# Patient Record
Sex: FEMALE | Race: OTHER | NOT HISPANIC OR LATINO | URBAN - METROPOLITAN AREA
[De-identification: names, ages, dates, MRNs, and addresses within clinical notes are randomized per-mention and may not be internally consistent; named-entity substitution may affect disease eponyms.]

---

## 2017-10-21 ENCOUNTER — EMERGENCY (EMERGENCY)
Facility: HOSPITAL | Age: 62
LOS: 1 days | Discharge: HOME | End: 2017-10-21
Attending: EMERGENCY MEDICINE | Admitting: EMERGENCY MEDICINE
Payer: COMMERCIAL

## 2017-10-21 VITALS
TEMPERATURE: 98 F | DIASTOLIC BLOOD PRESSURE: 68 MMHG | WEIGHT: 164.91 LBS | RESPIRATION RATE: 16 BRPM | SYSTOLIC BLOOD PRESSURE: 129 MMHG | HEART RATE: 84 BPM | HEIGHT: 61 IN

## 2017-10-21 DIAGNOSIS — Z98.89 OTHER SPECIFIED POSTPROCEDURAL STATES: Chronic | ICD-10-CM

## 2017-10-21 PROCEDURE — 99282 EMERGENCY DEPT VISIT SF MDM: CPT | Mod: 25

## 2017-10-21 PROCEDURE — 69210 REMOVE IMPACTED EAR WAX UNI: CPT

## 2017-10-21 PROCEDURE — 69210 REMOVE IMPACTED EAR WAX UNI: CPT | Mod: RT

## 2017-10-21 PROCEDURE — 99283 EMERGENCY DEPT VISIT LOW MDM: CPT | Mod: 25

## 2017-10-21 NOTE — ED PROVIDER NOTE - MEDICAL DECISION MAKING DETAILS
Patient is a 62 year old with acute hearing difficulty from one ear starting this morning. There is obvious wax impaction on the right year. Patient is a 62 year old with acute hearing difficulty from one ear starting this morning. There is obvious wax impaction on the right year. DDX: Ear wax impaction, sensorineural hearing loss, foreign body. Attempted to place debrox ear drops and gently manipulate with a curet. Patient had some improvement afterwards but still complained of dull hearing. Debrox drops were given to patient along with instructions to follow up with ENT if the problem persists. Patient is a 62 year old with acute hearing difficulty from one ear starting this morning. There is obvious wax impaction on the right year. DDX: Ear wax impaction, sensorineural hearing loss, foreign body. Attempted to place debrox ear drops and gently manipulate with a curet. Patient had some improvement afterwards but still complained of dull hearing. Ear lavage was done with resolution of symptoms. Debrox drops were given to patient along with instructions to follow up with ENT if the problem persists.

## 2017-10-21 NOTE — ED PROVIDER NOTE - CARE PLAN
Principal Discharge DX:	Hearing difficulty  Instructions for follow-up, activity and diet:	There is still some pressure in your ear Principal Discharge DX:	Hearing difficulty

## 2017-10-21 NOTE — ED PROVIDER NOTE - ATTENDING CONTRIBUTION TO CARE
Patient with occluded right ear. Sent in by urgent care for irrigation and suction. Moderate to severe ear fullness and irritation. Persistent.   right and left cerumen impaction right much worse than left, no pain to pinna/mastoid/antipinna to palpation, NAD, NCAT, MMM, Trachea midline, Normal conjunctiva, CTAB, Non-tachycardic, Normal perfusion, n No edema, No deformity of extremities, Appropriate, Cooperative, With capacity and insight, No rashes, CN grossly intact  cerumen impaction  will irrigate ear, will try debrox, bulb suction of ear, curette and discharge to ENT  No immediate life threatening issues present on history or clinical exam. Patient is a safe disposition home, has capacity and insight into their condition, ambulatory in the emergency department and will follow up with their doctor(s) this week. Patient  understands anticipatory guidance and was given strict return and follow up precautions. The patient has been informed of all concerning signs and symptoms to return to Emergency Department, the necessity to follow up with the PMD/Clinic/follow up provided within 2-3 days was explained, and the patient reports understanding of above with capacity and insight.

## 2017-10-21 NOTE — ED PROVIDER NOTE - NS ED ROS FT
Gen: Denies fever, weight loss  CV: Denies chest pain, palpitations  Resp: Denies SOB, cough  Msk: Denies back pain, LE swelling, extremity pain  Neuro: Denies LOC, weakness, seizures

## 2017-10-21 NOTE — ED PROCEDURE NOTE - ATTENDING CONTRIBUTION TO CARE
***Bin Marvin MD*** Attending physician was available for the key components of the procedure, the patient tolerated well. There were no complications with the procedure.

## 2017-10-21 NOTE — ED PROCEDURE NOTE - PROCEDURE ADDITIONAL DETAILS
Debrox drops placed in patients right ear gently manipulated with curet after with minimal wax removal. Subsequently an angiocath was placed on the end of a syringe a combination of warm water and hydrogen peroxide was flushed in the ear canal with minor pressure until the wax cleared. Debrox drops placed in patients right ear gently manipulated with curet after with minimal wax removal. Subsequently an Angiocath was placed on the end of a syringe, a combination of warm water and hydrogen peroxide was flushed in the ear canal with minor pressure until the wax cleared. 20 minute procedure

## 2017-10-21 NOTE — ED ADULT NURSE NOTE - PSH
C Section  x3 76, 78, 88  S/P Breast Lumpectomy  Right w/ axillary node biopsy  S/P D&C (status post dilation and curettage)  1986

## 2017-10-21 NOTE — ED ADULT NURSE NOTE - PMH
Cancer of breast  right 2003, treated with RT and tamoxifen. No Ivs, BPs or blood draws on that side  Menorrhagia  2010 while tamoxifen, required transfusion  Obesity, Class I, BMI 30-34.9    Osteopenia    Primary osteoarthritis of left knee    Renal calculi  passed on own, 2014

## 2017-10-21 NOTE — ED PROVIDER NOTE - PHYSICAL EXAMINATION
Gen: WDWN, NAD  HEENT: Right ear with yellow wax impaction, unable to visualize the TM, ear canal is erythematous like from previous urgent care intervention, no active bleeding, EOMI, no nasal discharge, mucous membranes moist  MSK: No open wounds, no bruising, no LE edema  Neuro: hearing intact in bilateral ears, A&Ox4, following commands, moving all four extremities spontaneously  Psych: appropriate mood

## 2017-10-21 NOTE — ED PROVIDER NOTE - OBJECTIVE STATEMENT
Patient is a 62 year old female who presents complaints of feeling like her ear is blocked since this morning. She went to urgent care to try and have the material removed but they were unsuccessful. They told her that she needed to come to the ED to have us remove it. She says that since this morning she has been unable to hear well out of her right ear and it feels full. She denies having this before. She did not have any trauma foreign body insertion or any other events she can remember.

## 2017-10-25 ENCOUNTER — CHART COPY (OUTPATIENT)
Age: 62
End: 2017-10-25

## 2017-10-25 RX ORDER — AMOXICILLIN 500 MG/1
500 CAPSULE ORAL
Qty: 20 | Refills: 2 | Status: ACTIVE | COMMUNITY
Start: 2017-10-25 | End: 1900-01-01

## 2020-11-13 ENCOUNTER — APPOINTMENT (OUTPATIENT)
Dept: PEDIATRIC MEDICAL GENETICS | Facility: CLINIC | Age: 65
End: 2020-11-13

## 2020-11-27 ENCOUNTER — OUTPATIENT (OUTPATIENT)
Dept: OUTPATIENT SERVICES | Facility: HOSPITAL | Age: 65
LOS: 1 days | End: 2020-11-27
Payer: MEDICARE

## 2020-11-27 ENCOUNTER — APPOINTMENT (OUTPATIENT)
Dept: CT IMAGING | Facility: IMAGING CENTER | Age: 65
End: 2020-11-27
Payer: MEDICARE

## 2020-11-27 ENCOUNTER — RESULT REVIEW (OUTPATIENT)
Age: 65
End: 2020-11-27

## 2020-11-27 DIAGNOSIS — Z00.8 ENCOUNTER FOR OTHER GENERAL EXAMINATION: ICD-10-CM

## 2020-11-27 DIAGNOSIS — Z98.89 OTHER SPECIFIED POSTPROCEDURAL STATES: Chronic | ICD-10-CM

## 2020-11-27 PROCEDURE — 74174 CTA ABD&PLVS W/CONTRAST: CPT | Mod: 26

## 2020-11-27 PROCEDURE — 74174 CTA ABD&PLVS W/CONTRAST: CPT

## 2020-12-03 ENCOUNTER — OUTPATIENT (OUTPATIENT)
Dept: OUTPATIENT SERVICES | Facility: HOSPITAL | Age: 65
LOS: 1 days | End: 2020-12-03
Payer: MEDICARE

## 2020-12-03 VITALS
HEIGHT: 60 IN | DIASTOLIC BLOOD PRESSURE: 70 MMHG | RESPIRATION RATE: 16 BRPM | OXYGEN SATURATION: 99 % | TEMPERATURE: 98 F | HEART RATE: 70 BPM | SYSTOLIC BLOOD PRESSURE: 120 MMHG | WEIGHT: 149.91 LBS

## 2020-12-03 DIAGNOSIS — Z96.659 PRESENCE OF UNSPECIFIED ARTIFICIAL KNEE JOINT: Chronic | ICD-10-CM

## 2020-12-03 DIAGNOSIS — C50.411 MALIGNANT NEOPLASM OF UPPER-OUTER QUADRANT OF RIGHT FEMALE BREAST: ICD-10-CM

## 2020-12-03 DIAGNOSIS — Z98.89 OTHER SPECIFIED POSTPROCEDURAL STATES: Chronic | ICD-10-CM

## 2020-12-03 LAB
ALBUMIN SERPL ELPH-MCNC: 4.3 G/DL — SIGNIFICANT CHANGE UP (ref 3.3–5)
ALP SERPL-CCNC: 58 U/L — SIGNIFICANT CHANGE UP (ref 40–120)
ALT FLD-CCNC: 8 U/L — SIGNIFICANT CHANGE UP (ref 4–33)
ANION GAP SERPL CALC-SCNC: 11 MMO/L — SIGNIFICANT CHANGE UP (ref 7–14)
AST SERPL-CCNC: 14 U/L — SIGNIFICANT CHANGE UP (ref 4–32)
BILIRUB SERPL-MCNC: 0.7 MG/DL — SIGNIFICANT CHANGE UP (ref 0.2–1.2)
BLD GP AB SCN SERPL QL: NEGATIVE — SIGNIFICANT CHANGE UP
BUN SERPL-MCNC: 21 MG/DL — SIGNIFICANT CHANGE UP (ref 7–23)
CALCIUM SERPL-MCNC: 9.8 MG/DL — SIGNIFICANT CHANGE UP (ref 8.4–10.5)
CHLORIDE SERPL-SCNC: 100 MMOL/L — SIGNIFICANT CHANGE UP (ref 98–107)
CO2 SERPL-SCNC: 27 MMOL/L — SIGNIFICANT CHANGE UP (ref 22–31)
CREAT SERPL-MCNC: 0.56 MG/DL — SIGNIFICANT CHANGE UP (ref 0.5–1.3)
GLUCOSE SERPL-MCNC: 71 MG/DL — SIGNIFICANT CHANGE UP (ref 70–99)
HCT VFR BLD CALC: 44 % — SIGNIFICANT CHANGE UP (ref 34.5–45)
HGB BLD-MCNC: 14.3 G/DL — SIGNIFICANT CHANGE UP (ref 11.5–15.5)
MCHC RBC-ENTMCNC: 30.2 PG — SIGNIFICANT CHANGE UP (ref 27–34)
MCHC RBC-ENTMCNC: 32.5 % — SIGNIFICANT CHANGE UP (ref 32–36)
MCV RBC AUTO: 92.8 FL — SIGNIFICANT CHANGE UP (ref 80–100)
NRBC # FLD: 0 K/UL — SIGNIFICANT CHANGE UP (ref 0–0)
PLATELET # BLD AUTO: 245 K/UL — SIGNIFICANT CHANGE UP (ref 150–400)
PMV BLD: 10.3 FL — SIGNIFICANT CHANGE UP (ref 7–13)
POTASSIUM SERPL-MCNC: 4.1 MMOL/L — SIGNIFICANT CHANGE UP (ref 3.5–5.3)
POTASSIUM SERPL-SCNC: 4.1 MMOL/L — SIGNIFICANT CHANGE UP (ref 3.5–5.3)
PROT SERPL-MCNC: 7.4 G/DL — SIGNIFICANT CHANGE UP (ref 6–8.3)
RBC # BLD: 4.74 M/UL — SIGNIFICANT CHANGE UP (ref 3.8–5.2)
RBC # FLD: 12.4 % — SIGNIFICANT CHANGE UP (ref 10.3–14.5)
RH IG SCN BLD-IMP: POSITIVE — SIGNIFICANT CHANGE UP
SODIUM SERPL-SCNC: 138 MMOL/L — SIGNIFICANT CHANGE UP (ref 135–145)
WBC # BLD: 8.42 K/UL — SIGNIFICANT CHANGE UP (ref 3.8–10.5)
WBC # FLD AUTO: 8.42 K/UL — SIGNIFICANT CHANGE UP (ref 3.8–10.5)

## 2020-12-03 PROCEDURE — 93010 ELECTROCARDIOGRAM REPORT: CPT

## 2020-12-03 RX ORDER — SODIUM CHLORIDE 9 MG/ML
1000 INJECTION, SOLUTION INTRAVENOUS
Refills: 0 | Status: DISCONTINUED | OUTPATIENT
Start: 2020-12-10 | End: 2020-12-12

## 2020-12-03 NOTE — H&P PST ADULT - VENOUS THROMBOEMBOLISM CURRENT STATUS
I have reviewed and confirmed nurses' notes regarding past medical, surgical, and family history.
(1) other risk factor (includes escalating BMI, pack-years of smoking, diabetes requiring insulin, chemotherapy, female gender and length of surgery)/(2) malignancy (present or previous)

## 2020-12-03 NOTE — H&P PST ADULT - HISTORY OF PRESENT ILLNESS
65 year old female presents to presurgical testing with diagnosis of malignant neoplasm of upper-outer quadrant of right female breast scheduled for bilateral mastectomy bilateral sentinel lymph node biopsy and bilateral deep inferior epigastric  flap and bilateral nipple areola reconstruction. Pt with right breast cancer 17 years ago s/p lumpectomy and radiation with abnormal surveillance mammogram and biopsy.

## 2020-12-03 NOTE — H&P PST ADULT - NEGATIVE OPHTHALMOLOGIC SYMPTOMS
no pain L/no blurred vision L/no diplopia/no pain R/no loss of vision L/no loss of vision R/no photophobia/no blurred vision R no loss of vision R/no pain R/no loss of vision L/no photophobia/no blurred vision L/no pain L/no diplopia

## 2020-12-03 NOTE — H&P PST ADULT - NSICDXFAMILYHX_GEN_ALL_CORE_FT
FAMILY HISTORY:  Father  Still living? No  Family history of asthma, Age at diagnosis: Age Unknown  Family history of colon cancer, Age at diagnosis: Age Unknown    Mother  Still living? No  Family history of leukemia, Age at diagnosis: Age Unknown    Sibling  Still living? Yes, Estimated age: 61-70  Family history of asthma, Age at diagnosis: Age Unknown

## 2020-12-03 NOTE — H&P PST ADULT - NEGATIVE ENMT SYMPTOMS
no tinnitus/no throat pain/no dysphagia/no hearing difficulty/no vertigo/no sinus symptoms/no ear pain/no nose bleeds

## 2020-12-03 NOTE — H&P PST ADULT - NEGATIVE NEUROLOGICAL SYMPTOMS
no syncope/no difficulty walking/no transient paralysis/no weakness/no generalized seizures/no paresthesias/no headache/no tremors

## 2020-12-03 NOTE — H&P PST ADULT - ASSESSMENT
Problem: malignant neoplasm of upper-outer quadrant of right female breast   Assessment and Plan: Pt is tentatively scheduled for bilateral mastectomy bilateral sentinel lymph node biopsy and bilateral deep inferior epigastric  flap and bilateral nipple areola reconstruction for 12/10/20. Pre-op instructions provided. Pt given verbal and written instructions with teach back on chlorhexidine shampoo and pepcid. Pt has a scheduled preop COVID test. Pt verbalized understanding with return demonstration.     66 y/o F pending medical evaluation due to advanced age. Problem: malignant neoplasm of upper-outer quadrant of right female breast   Assessment and Plan: Pt is tentatively scheduled for bilateral mastectomy bilateral sentinel lymph node biopsy and bilateral deep inferior epigastric  flap and bilateral nipple areola reconstruction for 12/10/20. Pre-op instructions provided. Pt given verbal and written instructions with teach back on chlorhexidine shampoo and pepcid. Pt has a scheduled preop COVID test. Pt verbalized understanding with return demonstration.     Pending medical evaluation c/o blurry vision right eye, had ophto eval, did not find opthalmologic etiology, was told symptom may be related to possible TIA?. Pt was advised to f/u with PCP for evaluation.

## 2020-12-03 NOTE — H&P PST ADULT - NEGATIVE CARDIOVASCULAR SYMPTOMS
no paroxysmal nocturnal dyspnea/no palpitations/no dyspnea on exertion/no chest pain/no peripheral edema

## 2020-12-03 NOTE — H&P PST ADULT - MUSCULOSKELETAL
details… detailed exam no joint swelling/normal strength/no joint erythema/ROM intact/no joint warmth/no calf tenderness

## 2020-12-03 NOTE — H&P PST ADULT - NSICDXPASTMEDICALHX_GEN_ALL_CORE_FT
PAST MEDICAL HISTORY:  Arthritis     Cancer of breast right 2003, treated with RT and tamoxifen. No Ivs, BPs or blood draws on that side    Menorrhagia 2010 while tamoxifen, required transfusion    Obesity, Class I, BMI 30-34.9     Osteopenia     Renal calculi passed on own, 2014

## 2020-12-03 NOTE — H&P PST ADULT - NSICDXPASTSURGICALHX_GEN_ALL_CORE_FT
PAST SURGICAL HISTORY:  C Section x3 76, 78, 88    H/O total knee replacement Right and Left    S/P Breast Lumpectomy Right w/ axillary node biopsy 2003    S/P D&C (status post dilation and curettage) 1986

## 2020-12-05 DIAGNOSIS — Z01.818 ENCOUNTER FOR OTHER PREPROCEDURAL EXAMINATION: ICD-10-CM

## 2020-12-07 ENCOUNTER — APPOINTMENT (OUTPATIENT)
Dept: DISASTER EMERGENCY | Facility: CLINIC | Age: 65
End: 2020-12-07

## 2020-12-08 LAB — SARS-COV-2 N GENE NPH QL NAA+PROBE: NOT DETECTED

## 2020-12-09 ENCOUNTER — TRANSCRIPTION ENCOUNTER (OUTPATIENT)
Age: 65
End: 2020-12-09

## 2020-12-09 NOTE — ASU PATIENT PROFILE, ADULT - PSH
C Section  x3 76, 78, 88  H/O total knee replacement  Right and Left  S/P Breast Lumpectomy  Right w/ axillary node biopsy 2003  S/P D&C (status post dilation and curettage)  1986

## 2020-12-09 NOTE — ASU PATIENT PROFILE, ADULT - VISION (WITH CORRECTIVE LENSES IF THE PATIENT USUALLY WEARS THEM):
wears contact lenses/Partially impaired: cannot see medication labels or newsprint, but can see obstacles in path, and the surrounding layout; can count fingers at arm's length

## 2020-12-09 NOTE — ASU PATIENT PROFILE, ADULT - PMH
Arthritis    Cancer of breast  right 2003, treated with RT and tamoxifen. No Ivs, BPs or blood draws on that side  Menorrhagia  2010 while tamoxifen, required transfusion  Obesity, Class I, BMI 30-34.9    Osteopenia    Renal calculi  passed on own, 2014

## 2020-12-10 ENCOUNTER — APPOINTMENT (OUTPATIENT)
Dept: NUCLEAR MEDICINE | Facility: HOSPITAL | Age: 65
End: 2020-12-10

## 2020-12-10 ENCOUNTER — INPATIENT (INPATIENT)
Facility: HOSPITAL | Age: 65
LOS: 1 days | Discharge: HOME HEALTH | End: 2020-12-12
Attending: SURGERY | Admitting: SURGERY
Payer: MEDICARE

## 2020-12-10 ENCOUNTER — RESULT REVIEW (OUTPATIENT)
Age: 65
End: 2020-12-10

## 2020-12-10 VITALS
OXYGEN SATURATION: 98 % | DIASTOLIC BLOOD PRESSURE: 71 MMHG | HEIGHT: 60 IN | RESPIRATION RATE: 18 BRPM | TEMPERATURE: 98 F | HEART RATE: 83 BPM | SYSTOLIC BLOOD PRESSURE: 136 MMHG | WEIGHT: 149.91 LBS

## 2020-12-10 DIAGNOSIS — C50.411 MALIGNANT NEOPLASM OF UPPER-OUTER QUADRANT OF RIGHT FEMALE BREAST: ICD-10-CM

## 2020-12-10 DIAGNOSIS — Z96.659 PRESENCE OF UNSPECIFIED ARTIFICIAL KNEE JOINT: Chronic | ICD-10-CM

## 2020-12-10 DIAGNOSIS — Z98.89 OTHER SPECIFIED POSTPROCEDURAL STATES: Chronic | ICD-10-CM

## 2020-12-10 LAB
ANION GAP SERPL CALC-SCNC: 12 MMOL/L — SIGNIFICANT CHANGE UP (ref 7–14)
BUN SERPL-MCNC: 15 MG/DL — SIGNIFICANT CHANGE UP (ref 7–23)
CALCIUM SERPL-MCNC: 8.6 MG/DL — SIGNIFICANT CHANGE UP (ref 8.4–10.5)
CHLORIDE SERPL-SCNC: 101 MMOL/L — SIGNIFICANT CHANGE UP (ref 98–107)
CO2 SERPL-SCNC: 23 MMOL/L — SIGNIFICANT CHANGE UP (ref 22–31)
CREAT SERPL-MCNC: 0.51 MG/DL — SIGNIFICANT CHANGE UP (ref 0.5–1.3)
GLUCOSE SERPL-MCNC: 168 MG/DL — HIGH (ref 70–99)
HCT VFR BLD CALC: 35.8 % — SIGNIFICANT CHANGE UP (ref 34.5–45)
HGB BLD-MCNC: 11.4 G/DL — LOW (ref 11.5–15.5)
MCHC RBC-ENTMCNC: 30.2 PG — SIGNIFICANT CHANGE UP (ref 27–34)
MCHC RBC-ENTMCNC: 31.8 GM/DL — LOW (ref 32–36)
MCV RBC AUTO: 94.7 FL — SIGNIFICANT CHANGE UP (ref 80–100)
NRBC # BLD: 0 /100 WBCS — SIGNIFICANT CHANGE UP
NRBC # FLD: 0 K/UL — SIGNIFICANT CHANGE UP
PLATELET # BLD AUTO: 190 K/UL — SIGNIFICANT CHANGE UP (ref 150–400)
POTASSIUM SERPL-MCNC: 3.7 MMOL/L — SIGNIFICANT CHANGE UP (ref 3.5–5.3)
POTASSIUM SERPL-SCNC: 3.7 MMOL/L — SIGNIFICANT CHANGE UP (ref 3.5–5.3)
RBC # BLD: 3.78 M/UL — LOW (ref 3.8–5.2)
RBC # FLD: 12.4 % — SIGNIFICANT CHANGE UP (ref 10.3–14.5)
RH IG SCN BLD-IMP: POSITIVE — SIGNIFICANT CHANGE UP
SODIUM SERPL-SCNC: 136 MMOL/L — SIGNIFICANT CHANGE UP (ref 135–145)
WBC # BLD: 14.23 K/UL — HIGH (ref 3.8–10.5)
WBC # FLD AUTO: 14.23 K/UL — HIGH (ref 3.8–10.5)

## 2020-12-10 PROCEDURE — 88307 TISSUE EXAM BY PATHOLOGIST: CPT | Mod: 26

## 2020-12-10 PROCEDURE — 88305 TISSUE EXAM BY PATHOLOGIST: CPT | Mod: 26

## 2020-12-10 PROCEDURE — 88331 PATH CONSLTJ SURG 1 BLK 1SPC: CPT | Mod: 26

## 2020-12-10 RX ORDER — CALCIUM CARBONATE 500(1250)
1 TABLET ORAL EVERY 4 HOURS
Refills: 0 | Status: DISCONTINUED | OUTPATIENT
Start: 2020-12-10 | End: 2020-12-12

## 2020-12-10 RX ORDER — CEFAZOLIN SODIUM 1 G
2000 VIAL (EA) INJECTION EVERY 8 HOURS
Refills: 0 | Status: COMPLETED | OUTPATIENT
Start: 2020-12-10 | End: 2020-12-11

## 2020-12-10 RX ORDER — BENZOCAINE AND MENTHOL 5; 1 G/100ML; G/100ML
1 LIQUID ORAL
Refills: 0 | Status: DISCONTINUED | OUTPATIENT
Start: 2020-12-10 | End: 2020-12-12

## 2020-12-10 RX ORDER — ONDANSETRON 8 MG/1
4 TABLET, FILM COATED ORAL ONCE
Refills: 0 | Status: DISCONTINUED | OUTPATIENT
Start: 2020-12-10 | End: 2020-12-11

## 2020-12-10 RX ORDER — ACETAMINOPHEN 500 MG
1000 TABLET ORAL EVERY 6 HOURS
Refills: 0 | Status: COMPLETED | OUTPATIENT
Start: 2020-12-10 | End: 2020-12-11

## 2020-12-10 RX ORDER — ONDANSETRON 8 MG/1
4 TABLET, FILM COATED ORAL EVERY 6 HOURS
Refills: 0 | Status: DISCONTINUED | OUTPATIENT
Start: 2020-12-10 | End: 2020-12-12

## 2020-12-10 RX ORDER — ACETAMINOPHEN 500 MG
1000 TABLET ORAL EVERY 8 HOURS
Refills: 0 | Status: DISCONTINUED | OUTPATIENT
Start: 2020-12-10 | End: 2020-12-10

## 2020-12-10 RX ORDER — METOCLOPRAMIDE HCL 10 MG
10 TABLET ORAL EVERY 8 HOURS
Refills: 0 | Status: DISCONTINUED | OUTPATIENT
Start: 2020-12-10 | End: 2020-12-12

## 2020-12-10 RX ORDER — ACETAMINOPHEN 500 MG
1000 TABLET ORAL EVERY 6 HOURS
Refills: 0 | Status: DISCONTINUED | OUTPATIENT
Start: 2020-12-10 | End: 2020-12-10

## 2020-12-10 RX ORDER — OXYCODONE HYDROCHLORIDE 5 MG/1
5 TABLET ORAL EVERY 4 HOURS
Refills: 0 | Status: DISCONTINUED | OUTPATIENT
Start: 2020-12-10 | End: 2020-12-10

## 2020-12-10 RX ORDER — OXYCODONE HYDROCHLORIDE 5 MG/1
10 TABLET ORAL EVERY 4 HOURS
Refills: 0 | Status: DISCONTINUED | OUTPATIENT
Start: 2020-12-10 | End: 2020-12-12

## 2020-12-10 RX ORDER — KETOROLAC TROMETHAMINE 30 MG/ML
15 SYRINGE (ML) INJECTION EVERY 6 HOURS
Refills: 0 | Status: DISCONTINUED | OUTPATIENT
Start: 2020-12-10 | End: 2020-12-10

## 2020-12-10 RX ORDER — SODIUM CHLORIDE 9 MG/ML
500 INJECTION, SOLUTION INTRAVENOUS ONCE
Refills: 0 | Status: COMPLETED | OUTPATIENT
Start: 2020-12-10 | End: 2020-12-10

## 2020-12-10 RX ORDER — KETOROLAC TROMETHAMINE 30 MG/ML
15 SYRINGE (ML) INJECTION EVERY 6 HOURS
Refills: 0 | Status: DISCONTINUED | OUTPATIENT
Start: 2020-12-10 | End: 2020-12-11

## 2020-12-10 RX ORDER — ACETAMINOPHEN 500 MG
975 TABLET ORAL EVERY 8 HOURS
Refills: 0 | Status: DISCONTINUED | OUTPATIENT
Start: 2020-12-10 | End: 2020-12-10

## 2020-12-10 RX ORDER — HEPARIN SODIUM 5000 [USP'U]/ML
5000 INJECTION INTRAVENOUS; SUBCUTANEOUS EVERY 12 HOURS
Refills: 0 | Status: DISCONTINUED | OUTPATIENT
Start: 2020-12-10 | End: 2020-12-12

## 2020-12-10 RX ORDER — OXYCODONE HYDROCHLORIDE 5 MG/1
5 TABLET ORAL EVERY 4 HOURS
Refills: 0 | Status: DISCONTINUED | OUTPATIENT
Start: 2020-12-10 | End: 2020-12-12

## 2020-12-10 RX ORDER — CEFAZOLIN SODIUM 1 G
2000 VIAL (EA) INJECTION EVERY 8 HOURS
Refills: 0 | Status: DISCONTINUED | OUTPATIENT
Start: 2020-12-10 | End: 2020-12-10

## 2020-12-10 RX ORDER — HYDROMORPHONE HYDROCHLORIDE 2 MG/ML
0.5 INJECTION INTRAMUSCULAR; INTRAVENOUS; SUBCUTANEOUS
Refills: 0 | Status: DISCONTINUED | OUTPATIENT
Start: 2020-12-10 | End: 2020-12-11

## 2020-12-10 RX ORDER — SODIUM CHLORIDE 9 MG/ML
1000 INJECTION, SOLUTION INTRAVENOUS
Refills: 0 | Status: DISCONTINUED | OUTPATIENT
Start: 2020-12-10 | End: 2020-12-10

## 2020-12-10 RX ORDER — LANOLIN ALCOHOL/MO/W.PET/CERES
3 CREAM (GRAM) TOPICAL AT BEDTIME
Refills: 0 | Status: DISCONTINUED | OUTPATIENT
Start: 2020-12-10 | End: 2020-12-12

## 2020-12-10 RX ORDER — SENNA PLUS 8.6 MG/1
2 TABLET ORAL AT BEDTIME
Refills: 0 | Status: DISCONTINUED | OUTPATIENT
Start: 2020-12-10 | End: 2020-12-12

## 2020-12-10 RX ORDER — OXYCODONE HYDROCHLORIDE 5 MG/1
10 TABLET ORAL EVERY 4 HOURS
Refills: 0 | Status: DISCONTINUED | OUTPATIENT
Start: 2020-12-10 | End: 2020-12-10

## 2020-12-10 RX ORDER — DIPHENHYDRAMINE HCL 50 MG
25 CAPSULE ORAL EVERY 4 HOURS
Refills: 0 | Status: DISCONTINUED | OUTPATIENT
Start: 2020-12-10 | End: 2020-12-12

## 2020-12-10 RX ADMIN — SENNA PLUS 2 TABLET(S): 8.6 TABLET ORAL at 22:12

## 2020-12-10 RX ADMIN — SODIUM CHLORIDE 125 MILLILITER(S): 9 INJECTION, SOLUTION INTRAVENOUS at 20:30

## 2020-12-10 RX ADMIN — Medication 100 MILLIGRAM(S): at 20:11

## 2020-12-10 RX ADMIN — Medication 400 MILLIGRAM(S): at 23:50

## 2020-12-10 RX ADMIN — SODIUM CHLORIDE 125 MILLILITER(S): 9 INJECTION, SOLUTION INTRAVENOUS at 17:58

## 2020-12-10 RX ADMIN — Medication 3 MILLIGRAM(S): at 22:13

## 2020-12-10 RX ADMIN — SODIUM CHLORIDE 1000 MILLILITER(S): 9 INJECTION, SOLUTION INTRAVENOUS at 17:57

## 2020-12-10 RX ADMIN — Medication 1 MILLIGRAM(S): at 18:30

## 2020-12-10 RX ADMIN — SODIUM CHLORIDE 30 MILLILITER(S): 9 INJECTION, SOLUTION INTRAVENOUS at 08:55

## 2020-12-10 NOTE — BRIEF OPERATIVE NOTE - OPERATION/FINDINGS
B/L DAGO flap, one artery/one vein each side
bilateral mastectomy with bilateral sentinel lymph node biopsy, negative nodes bilaterally with DAGO flap by PRS

## 2020-12-10 NOTE — BRIEF OPERATIVE NOTE - NSICDXBRIEFPROCEDURE_GEN_ALL_CORE_FT
PROCEDURES:  DAGO flap, free 10-Dec-2020 17:48:21  Sanjeev Schultz  
PROCEDURES:  Bilateral mastectomy with sentinel node biopsy 10-Dec-2020 15:36:08  Jaki Grier

## 2020-12-10 NOTE — CHART NOTE - NSCHARTNOTEFT_GEN_A_CORE
SURGERY POST-OP NOTE:    S: Patient underwent and tolerated procedure without issue and sent to PACU. Patient anxious earlier in the evening per RN, given Ativan. Patient sleeping in bed, able to be aroused but unable to stay alert for full patient interview.     O:  T(C): 37.3 (12-10-20 @ 17:25), Max: 37.3 (12-10-20 @ 17:25)  HR: 110 (12-10-20 @ 21:00) (104 - 117)  BP: 123/57 (12-10-20 @ 21:00) (113/57 - 142/72)  RR: 16 (12-10-20 @ 21:00) (12 - 22)  SpO2: 98% (12-10-20 @ 21:00) (95% - 100%)  Wt(kg): --                        11.4   14.23 )-----------( 190      ( 10 Dec 2020 18:40 )             35.8        12-10    136  |  101  |  15  ----------------------------<  168<H>  3.7   |  23  |  0.51    Ca    8.6      10 Dec 2020 18:40      Gen: NAD, sleeping comfortably in bed, arousable but not able to maintain alertness  Resp: Non-labored respirations in   Abd: Soft, nontender, nondistended. Dermabond prineo dressing intact   Ext:   Vasc:     Assessment/Plan:  65y Female now POD#0 s/p DAGO flap, free    Bilateral mastectomy with sentinel node biopsy        - Pain control  - Regular diet  - DVT ppx: Lovenox  - Out of bed and encourage early ambulation  - Incentive spirometry    Dispo: SURGERY POST-OP NOTE:    S: Patient underwent and tolerated procedure without issue and sent to PACU. Patient anxious earlier in the evening per RN, given Ativan. Patient sleeping in bed, able to be aroused but unable to stay alert for full patient interview.     O:  T(C): 37.3 (12-10-20 @ 17:25), Max: 37.3 (12-10-20 @ 17:25)  HR: 110 (12-10-20 @ 21:00) (104 - 117)  BP: 123/57 (12-10-20 @ 21:00) (113/57 - 142/72)  RR: 16 (12-10-20 @ 21:00) (12 - 22)  SpO2: 98% (12-10-20 @ 21:00) (95% - 100%)  Wt(kg): --                        11.4   14.23 )-----------( 190      ( 10 Dec 2020 18:40 )             35.8        12-10    136  |  101  |  15  ----------------------------<  168<H>  3.7   |  23  |  0.51    Ca    8.6      10 Dec 2020 18:40      Gen: NAD, sleeping comfortably in bed, arousable but not able to maintain alertness  Resp: Non-labored respirations  Chest: BL breasts warm, Vioptix montiors in place. R breast with some pink ecchymosis in outer lower quadrant (7-8 o'clock position)   Abd: Soft, nontender, nondistended. Dermabond prineo dressing intact   Ext: Warm, well perfused      Assessment/Plan:  65y Female now POD#0 s/p B/l mastectomy and SLNB with DAGO flap reconstruction.     - Pain control  - NPO  - DVT ppx: SQH  - Incentive spirometry  - Care per primary (Plastics)    Dispo: PACU SURGERY POST-OP NOTE:    S: Patient underwent and tolerated procedure without issue and sent to PACU. Patient anxious earlier in the evening per RN, given Ativan. Patient sleeping in bed, able to be aroused but unable to stay alert for full patient interview.     O:  T(C): 37.3 (12-10-20 @ 17:25), Max: 37.3 (12-10-20 @ 17:25)  HR: 110 (12-10-20 @ 21:00) (104 - 117)  BP: 123/57 (12-10-20 @ 21:00) (113/57 - 142/72)  RR: 16 (12-10-20 @ 21:00) (12 - 22)  SpO2: 98% (12-10-20 @ 21:00) (95% - 100%)  Wt(kg): --                        11.4   14.23 )-----------( 190      ( 10 Dec 2020 18:40 )             35.8        12-10    136  |  101  |  15  ----------------------------<  168<H>  3.7   |  23  |  0.51    Ca    8.6      10 Dec 2020 18:40      Gen: NAD, sleeping comfortably in bed, arousable but not able to maintain alertness  Resp: Non-labored respirations  Chest: BL breasts warm, Vioptix montiors in place. R breast with some pink ecchymosis in outer lower quadrant (7-8 o'clock position)   Abd: Soft, nontender, nondistended. Dermabond prineo dressing intact   Ext: Warm, well perfused      Assessment/Plan:  65y Female now POD#0 s/p B/l mastectomy and SLNB with DAGO flap reconstruction.     - f/u HR  - Pain control  - NPO  - DVT ppx: SQH  - Incentive spirometry  - Care per primary (Plastics)    Dispo: PACU

## 2020-12-11 LAB
ANION GAP SERPL CALC-SCNC: 6 MMOL/L — LOW (ref 7–14)
BUN SERPL-MCNC: 11 MG/DL — SIGNIFICANT CHANGE UP (ref 7–23)
CALCIUM SERPL-MCNC: 8.8 MG/DL — SIGNIFICANT CHANGE UP (ref 8.4–10.5)
CHLORIDE SERPL-SCNC: 105 MMOL/L — SIGNIFICANT CHANGE UP (ref 98–107)
CO2 SERPL-SCNC: 28 MMOL/L — SIGNIFICANT CHANGE UP (ref 22–31)
CREAT SERPL-MCNC: 0.49 MG/DL — LOW (ref 0.5–1.3)
GLUCOSE SERPL-MCNC: 114 MG/DL — HIGH (ref 70–99)
HCT VFR BLD CALC: 29.6 % — LOW (ref 34.5–45)
HGB BLD-MCNC: 9.5 G/DL — LOW (ref 11.5–15.5)
MCHC RBC-ENTMCNC: 30.3 PG — SIGNIFICANT CHANGE UP (ref 27–34)
MCHC RBC-ENTMCNC: 32.1 GM/DL — SIGNIFICANT CHANGE UP (ref 32–36)
MCV RBC AUTO: 94.3 FL — SIGNIFICANT CHANGE UP (ref 80–100)
NRBC # BLD: 0 /100 WBCS — SIGNIFICANT CHANGE UP
NRBC # FLD: 0 K/UL — SIGNIFICANT CHANGE UP
PLATELET # BLD AUTO: 186 K/UL — SIGNIFICANT CHANGE UP (ref 150–400)
POTASSIUM SERPL-MCNC: 4.4 MMOL/L — SIGNIFICANT CHANGE UP (ref 3.5–5.3)
POTASSIUM SERPL-SCNC: 4.4 MMOL/L — SIGNIFICANT CHANGE UP (ref 3.5–5.3)
RBC # BLD: 3.14 M/UL — LOW (ref 3.8–5.2)
RBC # FLD: 12.6 % — SIGNIFICANT CHANGE UP (ref 10.3–14.5)
SODIUM SERPL-SCNC: 139 MMOL/L — SIGNIFICANT CHANGE UP (ref 135–145)
WBC # BLD: 7.47 K/UL — SIGNIFICANT CHANGE UP (ref 3.8–10.5)
WBC # FLD AUTO: 7.47 K/UL — SIGNIFICANT CHANGE UP (ref 3.8–10.5)

## 2020-12-11 RX ORDER — IBUPROFEN 200 MG
400 TABLET ORAL EVERY 6 HOURS
Refills: 0 | Status: DISCONTINUED | OUTPATIENT
Start: 2020-12-11 | End: 2020-12-12

## 2020-12-11 RX ORDER — ACETAMINOPHEN 500 MG
975 TABLET ORAL EVERY 6 HOURS
Refills: 0 | Status: DISCONTINUED | OUTPATIENT
Start: 2020-12-11 | End: 2020-12-12

## 2020-12-11 RX ADMIN — SODIUM CHLORIDE 75 MILLILITER(S): 9 INJECTION, SOLUTION INTRAVENOUS at 08:30

## 2020-12-11 RX ADMIN — HEPARIN SODIUM 5000 UNIT(S): 5000 INJECTION INTRAVENOUS; SUBCUTANEOUS at 18:21

## 2020-12-11 RX ADMIN — Medication 400 MILLIGRAM(S): at 22:26

## 2020-12-11 RX ADMIN — Medication 1000 MILLIGRAM(S): at 12:30

## 2020-12-11 RX ADMIN — SENNA PLUS 2 TABLET(S): 8.6 TABLET ORAL at 22:26

## 2020-12-11 RX ADMIN — Medication 400 MILLIGRAM(S): at 05:17

## 2020-12-11 RX ADMIN — SODIUM CHLORIDE 75 MILLILITER(S): 9 INJECTION, SOLUTION INTRAVENOUS at 22:27

## 2020-12-11 RX ADMIN — Medication 400 MILLIGRAM(S): at 12:12

## 2020-12-11 RX ADMIN — Medication 400 MILLIGRAM(S): at 16:50

## 2020-12-11 RX ADMIN — Medication 975 MILLIGRAM(S): at 18:19

## 2020-12-11 RX ADMIN — Medication 1000 MILLIGRAM(S): at 05:35

## 2020-12-11 RX ADMIN — Medication 975 MILLIGRAM(S): at 19:05

## 2020-12-11 RX ADMIN — Medication 3 MILLIGRAM(S): at 22:27

## 2020-12-11 RX ADMIN — Medication 400 MILLIGRAM(S): at 15:50

## 2020-12-11 RX ADMIN — Medication 15 MILLIGRAM(S): at 06:53

## 2020-12-11 RX ADMIN — Medication 15 MILLIGRAM(S): at 00:05

## 2020-12-11 RX ADMIN — Medication 100 MILLIGRAM(S): at 05:14

## 2020-12-11 RX ADMIN — HEPARIN SODIUM 5000 UNIT(S): 5000 INJECTION INTRAVENOUS; SUBCUTANEOUS at 06:53

## 2020-12-11 RX ADMIN — Medication 15 MILLIGRAM(S): at 07:16

## 2020-12-11 RX ADMIN — SODIUM CHLORIDE 75 MILLILITER(S): 9 INJECTION, SOLUTION INTRAVENOUS at 09:49

## 2020-12-11 NOTE — PROGRESS NOTE ADULT - SUBJECTIVE AND OBJECTIVE BOX
POD #1    Overnight events:   - No acute events    SUBJECTIVE:  Feels well. Endorses some soreness around incisions that is well-controlled with medications. Denies nausea, vomiting.    OBJECTIVE:  Vital Signs Last 24 Hrs  T(C): 36.9 (11 Dec 2020 18:03), Max: 36.9 (11 Dec 2020 18:03)  T(F): 98.5 (11 Dec 2020 18:03), Max: 98.5 (11 Dec 2020 18:03)  HR: 93 (11 Dec 2020 21:03) (80 - 110)  BP: 96/47 (11 Dec 2020 21:03) (89/48 - 115/62)  BP(mean): 63 (11 Dec 2020 09:00) (55 - 75)  RR: 16 (11 Dec 2020 21:03) (14 - 21)  SpO2: 99% (11 Dec 2020 21:03) (99% - 100%)    Physical Examination:  GEN: NAD, resting quietly  PULM: symmetric chest rise bilaterally, no increased WOB  ABD: soft, nontender, nondistended, no rebound or guarding, incision CDI, Chandu serosang  BREAST: bilateral incisions c/d/i, soft, appropriately tender, all JPs serosang      LABS:                        9.5    7.47  )-----------( 186      ( 11 Dec 2020 06:16 )             29.6       12-11    139  |  105  |  11  ----------------------------<  114<H>  4.4   |  28  |  0.49<L>    Ca    8.8      11 Dec 2020 06:16

## 2020-12-11 NOTE — PROGRESS NOTE ADULT - ASSESSMENT
ASSESSMENT/PLAN:   INDY SALCEOD is a 65yFemale s/p bilateral mastectomy and bilateral DAGO flaps on 12/10    - q2 flap checks   - continue vioptix q2 checks  - d/c Desouza   - PO Tylenol and Ibuprofen, Oxy for severe pain PRN  - IVF to 75cc/hr  - continue SQH for DVT ChemoPPx, SCDs for DVT PPx  - regular diet   - OOB with assistance  - q4 vitals and I&Os  - O2 nasal cannula to 2L/min   - restart home medications  - transfer to surgical floor from PACU  -  drain care / teaching   - Set up VNS    Plastic Surgery   [facility]: [id number]  [facility]: [phone]

## 2020-12-11 NOTE — PROGRESS NOTE ADULT - SUBJECTIVE AND OBJECTIVE BOX
Plastic Surgery Progress Note (pg [facility]: [id number], [facility]: [phone])    SUBJECTIVE  The patient was seen and examined. No acute events overnight.    OBJECTIVE  ___________________________________________________  VITAL SIGNS / I&O's   Vital Signs Last 24 Hrs  T(C): 36.8 (11 Dec 2020 07:00), Max: 37.3 (10 Dec 2020 17:25)  T(F): 98.3 (11 Dec 2020 07:00), Max: 99.1 (10 Dec 2020 17:25)  HR: 85 (11 Dec 2020 07:00) (80 - 117)  BP: 99/55 (11 Dec 2020 07:00) (89/48 - 142/72)  BP(mean): 65 (11 Dec 2020 07:00) (55 - 88)  RR: 21 (11 Dec 2020 07:00) (12 - 22)  SpO2: 100% (11 Dec 2020 07:00) (95% - 100%)      10 Dec 2020 07:01  -  11 Dec 2020 07:00  --------------------------------------------------------  IN:    IV PiggyBack: 250 mL    Lactated Ringers: 500 mL    Lactated Ringers: 1250 mL    Oral Fluid: 60 mL  Total IN: 2060 mL    OUT:    Bulb (mL): 45 mL    Bulb (mL): 55 mL    Bulb (mL): 50 mL    Bulb (mL): 35 mL    Indwelling Catheter - Urethral (mL): 2850 mL  Total OUT: 3035 mL    Total NET: -975 mL        ___________________________________________________  PHYSICAL EXAM    -- CONSTITUTIONAL: NAD, lying in bed  -- NEURO: Awake, alert  -- CHEST: bilateral breasts soft, warm; skin paddles with good capillary refill, warm, vioptix signals stable; ASUNCION with SS output  -- PULM: Non-labored respirations  -- ABDOMEN: soft, no palpable collections, ASUNCION SS   -- EXTREMITIES: SCDs in place    ___________________________________________________  LABS                        9.5    7.47  )-----------( 186      ( 11 Dec 2020 06:16 )             29.6     11 Dec 2020 06:16    139    |  105    |  11     ----------------------------<  114    4.4     |  28     |  0.49     Ca    8.8        11 Dec 2020 06:16      ___________________________________________________  MEDICATIONS  (STANDING):  acetaminophen  IVPB .. 1000 milliGRAM(s) IV Intermittent every 6 hours  heparin   Injectable 5000 Unit(s) SubCutaneous every 12 hours  ketorolac   Injectable 15 milliGRAM(s) IV Push every 6 hours  lactated ringers. 1000 milliLiter(s) (125 mL/Hr) IV Continuous <Continuous>  melatonin 3 milliGRAM(s) Oral at bedtime  senna 2 Tablet(s) Oral at bedtime    MEDICATIONS  (PRN):  aluminum hydroxide/magnesium hydroxide/simethicone Suspension 30 milliLiter(s) Oral every 4 hours PRN Dyspepsia  artificial tears (preservative free) Ophthalmic Solution 2 Drop(s) Both EYES every 1 hour PRN Dry Eyes  benzocaine 15 mG/menthol 3.6 mG (Sugar-Free) Lozenge 1 Lozenge Oral every 3 hours PRN Sore Throat  calcium carbonate    500 mG (Tums) Chewable 1 Tablet(s) Chew every 4 hours PRN Dyspepsia  diphenhydrAMINE   Injectable 25 milliGRAM(s) IV Push every 4 hours PRN Itching  HYDROmorphone  Injectable 0.5 milliGRAM(s) IV Push every 10 minutes PRN Moderate Pain (4 - 6)  metoclopramide Injectable 10 milliGRAM(s) IV Push every 8 hours PRN Nausea and/or Vomiting  ondansetron Injectable 4 milliGRAM(s) IV Push every 6 hours PRN Nausea and/or Vomiting  ondansetron Injectable 4 milliGRAM(s) IV Push once PRN Nausea and/or Vomiting  oxyCODONE    IR 5 milliGRAM(s) Oral every 4 hours PRN Moderate Pain (4 - 6)  oxyCODONE    IR 10 milliGRAM(s) Oral every 4 hours PRN Severe Pain (7 - 10)

## 2020-12-11 NOTE — PROVIDER CONTACT NOTE (CHANGE IN STATUS NOTIFICATION) - ACTION/TREATMENT ORDERED:
MD made aware of changes in left vioptix and swelling of left area under abdominal incision. MD from team will evaluate in half hour.

## 2020-12-11 NOTE — PROVIDER CONTACT NOTE (OTHER) - ASSESSMENT
BP 90/43. HR 94.  PT states she is concerned about swelling. BL  breast sites unchanged, ecchymotic/ erythemic areas but no additonal swelling noted.

## 2020-12-11 NOTE — PROGRESS NOTE ADULT - SUBJECTIVE AND OBJECTIVE BOX
Stable overnight. No c/o    VSS  ViOptix 80-90s  b/l flaps soft, warm, good cap refill  mastectomy flaps intact  abd stable  incis c/d/i  JPs serosang    Doing well  tx to floor  ambulate  reg diet   d/c santos

## 2020-12-11 NOTE — PROVIDER CONTACT NOTE (CHANGE IN STATUS NOTIFICATION) - ASSESSMENT
Left vioptix 79-81 after ambulating to bathroom. RN repositioned but no improvement in saturation. Breast remains red but soft, no changes in status. Left groin area under abdominal incision noted to be swelling when pt standing. Soft to touch, denies pain. Swelling improves when pt laying.

## 2020-12-11 NOTE — PROGRESS NOTE ADULT - ASSESSMENT
66yo F POD1 s/p b/l mastectomies with sentinel lymph node biopsy and DAGO flap reconstruction. Patient stable and recovering appropriately.    Plan:  - Monitor flaps with vioptix  - Pain control PRN  - OOB/ambulation  - Desouza per urology  - F/u AM labs    D Team Surgery, [id number]

## 2020-12-12 ENCOUNTER — TRANSCRIPTION ENCOUNTER (OUTPATIENT)
Age: 65
End: 2020-12-12

## 2020-12-12 VITALS
DIASTOLIC BLOOD PRESSURE: 57 MMHG | RESPIRATION RATE: 18 BRPM | SYSTOLIC BLOOD PRESSURE: 109 MMHG | OXYGEN SATURATION: 95 % | HEART RATE: 87 BPM | TEMPERATURE: 98 F

## 2020-12-12 RX ORDER — IBUPROFEN 200 MG
1 TABLET ORAL
Qty: 0 | Refills: 0 | DISCHARGE
Start: 2020-12-12

## 2020-12-12 RX ORDER — ACETAMINOPHEN 500 MG
3 TABLET ORAL
Qty: 0 | Refills: 0 | DISCHARGE
Start: 2020-12-12

## 2020-12-12 RX ADMIN — Medication 400 MILLIGRAM(S): at 09:40

## 2020-12-12 RX ADMIN — Medication 400 MILLIGRAM(S): at 14:50

## 2020-12-12 RX ADMIN — Medication 975 MILLIGRAM(S): at 06:14

## 2020-12-12 RX ADMIN — Medication 400 MILLIGRAM(S): at 03:57

## 2020-12-12 RX ADMIN — Medication 400 MILLIGRAM(S): at 14:20

## 2020-12-12 RX ADMIN — HEPARIN SODIUM 5000 UNIT(S): 5000 INJECTION INTRAVENOUS; SUBCUTANEOUS at 06:13

## 2020-12-12 RX ADMIN — Medication 400 MILLIGRAM(S): at 09:10

## 2020-12-12 RX ADMIN — Medication 975 MILLIGRAM(S): at 11:33

## 2020-12-12 RX ADMIN — Medication 400 MILLIGRAM(S): at 05:04

## 2020-12-12 RX ADMIN — Medication 975 MILLIGRAM(S): at 00:19

## 2020-12-12 RX ADMIN — SODIUM CHLORIDE 75 MILLILITER(S): 9 INJECTION, SOLUTION INTRAVENOUS at 09:12

## 2020-12-12 RX ADMIN — Medication 975 MILLIGRAM(S): at 12:03

## 2020-12-12 RX ADMIN — Medication 1 TABLET(S): at 11:33

## 2020-12-12 NOTE — PROVIDER CONTACT NOTE (OTHER) - ASSESSMENT
Bilateral axilla remains red, soft to touch and intact. Left groin swelling soft to touch however ecchymosis has increased in size

## 2020-12-12 NOTE — DISCHARGE NOTE PROVIDER - NSDCMRMEDTOKEN_GEN_ALL_CORE_FT
acetaminophen 325 mg oral tablet: 3 tab(s) orally every 6 hours  ibuprofen 400 mg oral tablet: 1 tab(s) orally every 6 hours  Vitamin B12 1000 mcg/mL injectable solution:   Vitamin D3: 1 tab(s) orally once a week

## 2020-12-12 NOTE — DISCHARGE NOTE PROVIDER - PROVIDER TOKENS
PROVIDER:[TOKEN:[[id number]],FOLLOWUP:[1 week]],PROVIDER:[TOKEN:[[id number]],FOLLOWUP:[1 week]]

## 2020-12-12 NOTE — PROGRESS NOTE ADULT - SUBJECTIVE AND OBJECTIVE BOX
Plastic Surgery Progress Note (pg [facility]: [id number], [facility]: [phone])    SUBJECTIVE  The patient was seen and examined. Patient had some fluctuations in vioptix readings that required troubleshooting however flaps remained stable. No other complaints this morning.     OBJECTIVE  ___________________________________________________  VITAL SIGNS / I&O's   Vital Signs Last 24 Hrs  T(C): 36.7 (12 Dec 2020 03:55), Max: 36.9 (11 Dec 2020 18:03)  T(F): 98.1 (12 Dec 2020 03:55), Max: 98.5 (11 Dec 2020 18:03)  HR: 92 (12 Dec 2020 03:55) (82 - 94)  BP: 104/49 (12 Dec 2020 03:55) (90/43 - 104/50)  BP(mean): 63 (11 Dec 2020 09:00) (63 - 63)  RR: 16 (12 Dec 2020 03:55) (14 - 18)  SpO2: 100% (12 Dec 2020 03:55) (99% - 100%)      11 Dec 2020 07:01  -  12 Dec 2020 07:00  --------------------------------------------------------  IN:    IV PiggyBack: 100 mL    Lactated Ringers: 1900 mL    Oral Fluid: 50 mL  Total IN: 2050 mL    OUT:    Bulb (mL): 100.5 mL    Bulb (mL): 53.5 mL    Bulb (mL): 82.5 mL    Bulb (mL): 122 mL    Indwelling Catheter - Urethral (mL): 530 mL    Voided (mL): 2175 mL  Total OUT: 3063.5 mL    Total NET: -1013.5 mL      12 Dec 2020 07:01  -  12 Dec 2020 08:42  --------------------------------------------------------  IN:  Total IN: 0 mL    OUT:    Voided (mL): 200 mL  Total OUT: 200 mL    Total NET: -200 mL        ___________________________________________________  PHYSICAL EXAM    -- CONSTITUTIONAL: NAD, lying in bed  -- NEURO: Awake, alert  -- CHEST: bilateral breasts soft, warm; skin paddles with good capillary refill, warm, vioptix signals stable; ASUNCION with SS output  -- PULM: Non-labored respirations  -- ABDOMEN: soft, no palpable collections, ASUNCION SS   -- EXTREMITIES: SCDs in place      ___________________________________________________  LABS                        9.5    7.47  )-----------( 186      ( 11 Dec 2020 06:16 )             29.6     11 Dec 2020 06:16    139    |  105    |  11     ----------------------------<  114    4.4     |  28     |  0.49     Ca    8.8        11 Dec 2020 06:16    ___________________________________________________  MEDICATIONS  (STANDING):  acetaminophen   Tablet .. 975 milliGRAM(s) Oral every 6 hours  heparin   Injectable 5000 Unit(s) SubCutaneous every 12 hours  ibuprofen  Tablet. 400 milliGRAM(s) Oral every 6 hours  lactated ringers. 1000 milliLiter(s) (75 mL/Hr) IV Continuous <Continuous>  melatonin 3 milliGRAM(s) Oral at bedtime  senna 2 Tablet(s) Oral at bedtime    MEDICATIONS  (PRN):  aluminum hydroxide/magnesium hydroxide/simethicone Suspension 30 milliLiter(s) Oral every 4 hours PRN Dyspepsia  artificial tears (preservative free) Ophthalmic Solution 2 Drop(s) Both EYES every 1 hour PRN Dry Eyes  benzocaine 15 mG/menthol 3.6 mG (Sugar-Free) Lozenge 1 Lozenge Oral every 3 hours PRN Sore Throat  calcium carbonate    500 mG (Tums) Chewable 1 Tablet(s) Chew every 4 hours PRN Dyspepsia  diphenhydrAMINE   Injectable 25 milliGRAM(s) IV Push every 4 hours PRN Itching  metoclopramide Injectable 10 milliGRAM(s) IV Push every 8 hours PRN Nausea and/or Vomiting  ondansetron Injectable 4 milliGRAM(s) IV Push every 6 hours PRN Nausea and/or Vomiting  oxyCODONE    IR 5 milliGRAM(s) Oral every 4 hours PRN Moderate Pain (4 - 6)  oxyCODONE    IR 10 milliGRAM(s) Oral every 4 hours PRN Severe Pain (7 - 10)

## 2020-12-12 NOTE — PROGRESS NOTE ADULT - SUBJECTIVE AND OBJECTIVE BOX
No c/o    VSS (BP soft)  VIOptix stable  b/l brast soft, warm, good cap refill  abd c/d/i, soft  JPs serosang    DOing well  ambulate  pos d/c in PM

## 2020-12-12 NOTE — PROGRESS NOTE ADULT - ASSESSMENT
ASSESSMENT/PLAN:   INDY SALCEDO is a 65yFemale s/p bilateral mastectomy and bilateral DAGO flaps on 12/10    - d/c IVF   - d/c O2 nasal cannula   - Ambulate with assistance   - continue SQH DVT ChemoPPx and SCD DVT PPx  - PO Tylenol and Ibuprofen q6 standing - alternate between the two  - D/C Vioptix probes  - q4 flap checks  - q4 vitals and I&Os  - ensure VNS is set up  - ASUNCION Drain teaching and care   - Ensure has prescriptions for home   - dispo - home with VNS    Plastic Surgery   [facility]: [id number]  [facility]: [phone]

## 2020-12-12 NOTE — PROVIDER CONTACT NOTE (OTHER) - ASSESSMENT
Patient axox4. No c/o pain or discomfort. Bilateral breast redness continues but remains the same. Bilateral breast soft to touch and no c/o pain.

## 2020-12-12 NOTE — PROGRESS NOTE ADULT - SUBJECTIVE AND OBJECTIVE BOX
GENERAL SURGERY DAILY PROGRESS NOTE:    Interval:  No acute events overnight.    Subjective:  Feels well. Endorses some soreness around incisions that is well-controlled with medications. Denies nausea, vomiting.    Vital Signs Last 24 Hrs  T(C): 36.9 (12 Dec 2020 14:10), Max: 36.9 (11 Dec 2020 18:03)  T(F): 98.5 (12 Dec 2020 14:10), Max: 98.5 (11 Dec 2020 18:03)  HR: 87 (12 Dec 2020 14:10) (86 - 93)  BP: 109/57 (12 Dec 2020 14:10) (96/47 - 109/57)  BP(mean): --  RR: 18 (12 Dec 2020 14:10) (16 - 18)  SpO2: 95% (12 Dec 2020 14:10) (95% - 100%)    Physical Examination:  GEN: NAD, resting quietly  PULM: symmetric chest rise bilaterally, no increased WOB  ABD: soft, nontender, nondistended, no rebound or guarding, incision CDI, Chandu serosang  BREAST: bilateral incisions c/d/i, soft, appropriately tender, all JPs serosang    I&O's Detail    11 Dec 2020 07:01  -  12 Dec 2020 07:00  --------------------------------------------------------  IN:    IV PiggyBack: 100 mL    Lactated Ringers: 1900 mL    Oral Fluid: 50 mL  Total IN: 2050 mL    OUT:    Bulb (mL): 100.5 mL    Bulb (mL): 53.5 mL    Bulb (mL): 82.5 mL    Bulb (mL): 122 mL    Indwelling Catheter - Urethral (mL): 530 mL    Voided (mL): 2175 mL  Total OUT: 3063.5 mL    Total NET: -1013.5 mL      12 Dec 2020 07:01  -  12 Dec 2020 16:10  --------------------------------------------------------  IN:    Lactated Ringers: 225 mL  Total IN: 225 mL    OUT:    Bulb (mL): 49 mL    Bulb (mL): 35 mL    Bulb (mL): 34 mL    Bulb (mL): 64 mL    Voided (mL): 500 mL  Total OUT: 682 mL    Total NET: -457 mL          Daily     Daily     MEDICATIONS  (STANDING):  acetaminophen   Tablet .. 975 milliGRAM(s) Oral every 6 hours  heparin   Injectable 5000 Unit(s) SubCutaneous every 12 hours  ibuprofen  Tablet. 400 milliGRAM(s) Oral every 6 hours  melatonin 3 milliGRAM(s) Oral at bedtime  senna 2 Tablet(s) Oral at bedtime    MEDICATIONS  (PRN):  aluminum hydroxide/magnesium hydroxide/simethicone Suspension 30 milliLiter(s) Oral every 4 hours PRN Dyspepsia  artificial tears (preservative free) Ophthalmic Solution 2 Drop(s) Both EYES every 1 hour PRN Dry Eyes  benzocaine 15 mG/menthol 3.6 mG (Sugar-Free) Lozenge 1 Lozenge Oral every 3 hours PRN Sore Throat  calcium carbonate    500 mG (Tums) Chewable 1 Tablet(s) Chew every 4 hours PRN Dyspepsia  diphenhydrAMINE   Injectable 25 milliGRAM(s) IV Push every 4 hours PRN Itching  metoclopramide Injectable 10 milliGRAM(s) IV Push every 8 hours PRN Nausea and/or Vomiting  ondansetron Injectable 4 milliGRAM(s) IV Push every 6 hours PRN Nausea and/or Vomiting  oxyCODONE    IR 5 milliGRAM(s) Oral every 4 hours PRN Moderate Pain (4 - 6)  oxyCODONE    IR 10 milliGRAM(s) Oral every 4 hours PRN Severe Pain (7 - 10)      LABS:                        9.5    7.47  )-----------( 186      ( 11 Dec 2020 06:16 )             29.6     12-11    139  |  105  |  11  ----------------------------<  114<H>  4.4   |  28  |  0.49<L>    Ca    8.8      11 Dec 2020 06:16

## 2020-12-12 NOTE — DISCHARGE NOTE NURSING/CASE MANAGEMENT/SOCIAL WORK - NSDCPNINST_GEN_ALL_CORE
Please follow up with Dr. Vickers and Dr. Blanchard in 1 week. It is recommended to do a sponge bath until your drains are removed. Please record your drain output accurately in the chart provided and bring it with you at your follow up appointment. Notify your provider if you experience a fever greater than 101, shortness of breath, chest pain, or unrelieved pain. Keep a close eye on your breast flap and notify your provider if you notice if it is: cool to touch, purple, black, or pale white.

## 2020-12-12 NOTE — DISCHARGE NOTE PROVIDER - HOSPITAL COURSE
Patient is 65F who underwent bilateral mastectomies and bilateral breast reconstruction with DAGO flaps in the OR. The patient tolerated the procedure well. Postoperatively the patient was sent to the PACU. The patient remained in the PACU overnight. The patient's flaps were monitored by Vioptix and by clinical examination. On POD 1, the patient was hemodynamically stable; was transferred to a surgical floor; was advanced to a regular diet; was placed on her home medications; and was out of bed to a chair; the santos was removed and the patient voided appropriately. On POD 2, Vioptix monitors were removed; and the patient ambulated. During the patient's hospital course, the patient's pain was controlled by IV pain medications and then by PO pain medications.    At the time of discharge, the patient was hemodynamically stable, was tolerating PO diet, was voiding urine, was ambulating, and was comfortable with adequate pain control. The patient was instructed to follow up with Dr. Blanchard within x1 week(s) after discharge from the hospital and Dr. Vickers within x1 week(s) after discharge from the hospital. The patient/family felt comfortable with discharge. The patient received prescriptions for oral pain medication. The patient had no other issues.

## 2020-12-12 NOTE — PROVIDER CONTACT NOTE (OTHER) - ACTION/TREATMENT ORDERED:
MD Slade assessed patient. No medical intervention at this time. Will continue observation.
No medical intervention. Will continue with observation.
MD aware of situation. No actions at this time. As per MD swelling is normal, will evaluate patient on rounds.

## 2020-12-12 NOTE — DISCHARGE NOTE PROVIDER - NSDCCPTREATMENT_GEN_ALL_CORE_FT
PRINCIPAL PROCEDURE  Procedure: DAGO flap, free  Findings and Treatment:       SECONDARY PROCEDURE  Procedure: Bilateral mastectomy with sentinel node biopsy  Findings and Treatment:

## 2020-12-12 NOTE — DISCHARGE NOTE PROVIDER - CARE PROVIDER_API CALL
Js Blanchard  PLASTIC SURGERY  [address], [address]  [place] [address]  Phone: [phone]  Fax: [phone]  Follow Up Time: 1 week    Tomeka Vickers  SURGERY  [address], [address]  [place] [address]  Phone: [phone]  Fax: [phone]  Follow Up Time: 1 week

## 2020-12-12 NOTE — PROGRESS NOTE ADULT - ASSESSMENT
66yo F POD2 s/p b/l mastectomies with sentinel lymph node biopsy and DAGO flap reconstruction. Patient stable and recovering appropriately.    Plan:  - Monitor flaps with vioptix  - Pain control PRN  - OOB/ambulation  - Desouza per urology  - F/u AM labs    [facility] Surgery, [id number]

## 2020-12-12 NOTE — DISCHARGE NOTE PROVIDER - NSDCFUADDINST_GEN_ALL_CORE_FT
Your prescriptions have been sent to your pharmacy for you from Dr. Blanchard's office. Take them as described.       Activity:  - Rest at home during the first few days after surgery.  - Walking is encouraged, but strenuous exercise is not allowed until 6 weeks after surgery.   - Avoid strenuous activity. Do not lift your arms above your head. Do not lift more than 5-10  pounds.    Sleep:  Sleep on your back for the first two weeks after surgery.    Showering:  - You may take sponge baths following discharge. Pat dry. We will instruct you to shower once you  have drains removed from your breasts in the office.  - Do not take a bath or submerge yourself in water.  - You will have special adhesive glue or tape over the incisions. Do not take these off.    For the Breast:  You have just undergone a breast reconstruction with the DAGO flap. Your breast will likely have bruising  and possibly some blistering on the skin, which is expected after a mastectomy. You have a small patch  of skin on your breasts, which is a different color than the surrounding breast skin. This paddle of skin  comes from the abdomen and is an indicator of how the flap is doing. It is important to check this skin  paddle daily. The skin should remain the same color. If the color of the small patch changes (i.e blue,  purple, pale), please call the office immediately ([phone]).    For the Abdomen:  Your incision and belly button are covered in a special medical grade sealant, which will come off in the  office, 2-3 weeks after surgery.    Drains:  Both the breast and abdomen will have drains. It is important to empty the drains twice daily and  record the outputs. Please bring this sheet to your appointment after surgery. Based on the output, the  drains will be removed 1 to 3 weeks after surgery. For the drains to be working appropriately, the bulbs  need to be collapsed to create a light suction. The nurse in the hospital will review the drain care with  you and your family prior to discharge home. It is best to safely secure the drains to your clothes with a  safety pin.    you may call the office at [phone]  at any time with additional questions or concerns.    Call the Office:  Do not hesitate to call the office with any concerns or questions. A doctor is available to answer your  questions 24 hours a day. Please notify us immediately at [phone] if:  1. You have increased swelling, pain, or color change in the breast.  2. One breast becomes suddenly significantly larger than the other breast.  3. You have a sudden increase in swelling of the abdomen.  4. You have redness develop around the incisions.  5. You have a fever greater than 101 F.  6. You develop sudden increase in pain.  7. You develop drainage, spreading redness or foul odor  8. You have any questions.

## 2020-12-17 LAB — SURGICAL PATHOLOGY STUDY: SIGNIFICANT CHANGE UP

## 2021-02-02 NOTE — H&P PST ADULT - ANESTHESIA, PREVIOUS REACTION, PROFILE
From: Rowan Dubin  To: Jonas Dunlap 
Future Appointments   Date Time Provider Talya Salgado   2/2/2021  1:15 PM Christiano Antonio, [facility]
Yes, okay for virtual visit. Thanks!
Denies family Hx of malignant hyperthermia/none

## 2021-04-08 NOTE — DISCHARGE NOTE NURSING/CASE MANAGEMENT/SOCIAL WORK - PATIENT PORTAL LINK FT
Left message for patient to call back.  
Referral Type: INTERNAL  Location: [place]  Procedure: Colonoscopy  Diagnosis: Encounter for screening for malignant neoplasm of colon   Referring Provider: Dr.Leah Vega  Referral To: UNSPECIFIED  Referral Notes: Encounter for screening for malignant neoplasm of colon    Previous Procedure: NO       1st attempt: Left message for patient to call back   2nd attempt: Referral letter sent   Sent to scheduling pool.                   
You can access the FollowMyHealth Patient Portal offered by [facility] by registering at the following website: [email or web address]. By joining [facility]’s FollowMyHealth portal, you will also be able to view your health information using other applications (apps) compatible with our system.

## 2021-04-20 ENCOUNTER — OUTPATIENT (OUTPATIENT)
Dept: OUTPATIENT SERVICES | Facility: HOSPITAL | Age: 66
LOS: 1 days | End: 2021-04-20
Payer: MEDICARE

## 2021-04-20 VITALS
HEART RATE: 73 BPM | SYSTOLIC BLOOD PRESSURE: 116 MMHG | DIASTOLIC BLOOD PRESSURE: 82 MMHG | RESPIRATION RATE: 14 BRPM | HEIGHT: 61 IN | OXYGEN SATURATION: 98 % | TEMPERATURE: 98 F | WEIGHT: 143.96 LBS

## 2021-04-20 DIAGNOSIS — Z98.890 OTHER SPECIFIED POSTPROCEDURAL STATES: Chronic | ICD-10-CM

## 2021-04-20 DIAGNOSIS — Z90.13 ACQUIRED ABSENCE OF BILATERAL BREASTS AND NIPPLES: ICD-10-CM

## 2021-04-20 DIAGNOSIS — Z90.13 ACQUIRED ABSENCE OF BILATERAL BREASTS AND NIPPLES: Chronic | ICD-10-CM

## 2021-04-20 DIAGNOSIS — N65.0 DEFORMITY OF RECONSTRUCTED BREAST: ICD-10-CM

## 2021-04-20 DIAGNOSIS — Z85.3 PERSONAL HISTORY OF MALIGNANT NEOPLASM OF BREAST: ICD-10-CM

## 2021-04-20 DIAGNOSIS — Z96.659 PRESENCE OF UNSPECIFIED ARTIFICIAL KNEE JOINT: Chronic | ICD-10-CM

## 2021-04-20 DIAGNOSIS — S31.109S UNSPECIFIED OPEN WOUND OF ABDOMINAL WALL, UNSPECIFIED QUADRANT WITHOUT PENETRATION INTO PERITONEAL CAVITY, SEQUELA: ICD-10-CM

## 2021-04-20 DIAGNOSIS — Z98.89 OTHER SPECIFIED POSTPROCEDURAL STATES: Chronic | ICD-10-CM

## 2021-04-20 LAB
ANION GAP SERPL CALC-SCNC: 11 MMOL/L — SIGNIFICANT CHANGE UP (ref 5–17)
BLD GP AB SCN SERPL QL: NEGATIVE — SIGNIFICANT CHANGE UP
BUN SERPL-MCNC: 20 MG/DL — SIGNIFICANT CHANGE UP (ref 7–23)
CALCIUM SERPL-MCNC: 9.4 MG/DL — SIGNIFICANT CHANGE UP (ref 8.4–10.5)
CHLORIDE SERPL-SCNC: 102 MMOL/L — SIGNIFICANT CHANGE UP (ref 96–108)
CO2 SERPL-SCNC: 22 MMOL/L — SIGNIFICANT CHANGE UP (ref 22–31)
CREAT SERPL-MCNC: 0.54 MG/DL — SIGNIFICANT CHANGE UP (ref 0.5–1.3)
GLUCOSE SERPL-MCNC: 99 MG/DL — SIGNIFICANT CHANGE UP (ref 70–99)
HCT VFR BLD CALC: 44.6 % — SIGNIFICANT CHANGE UP (ref 34.5–45)
HGB BLD-MCNC: 13.9 G/DL — SIGNIFICANT CHANGE UP (ref 11.5–15.5)
MCHC RBC-ENTMCNC: 28.2 PG — SIGNIFICANT CHANGE UP (ref 27–34)
MCHC RBC-ENTMCNC: 31.2 GM/DL — LOW (ref 32–36)
MCV RBC AUTO: 90.5 FL — SIGNIFICANT CHANGE UP (ref 80–100)
NRBC # BLD: 0 /100 WBCS — SIGNIFICANT CHANGE UP (ref 0–0)
PLATELET # BLD AUTO: 242 K/UL — SIGNIFICANT CHANGE UP (ref 150–400)
POTASSIUM SERPL-MCNC: 4.2 MMOL/L — SIGNIFICANT CHANGE UP (ref 3.5–5.3)
POTASSIUM SERPL-SCNC: 4.2 MMOL/L — SIGNIFICANT CHANGE UP (ref 3.5–5.3)
RBC # BLD: 4.93 M/UL — SIGNIFICANT CHANGE UP (ref 3.8–5.2)
RBC # FLD: 13.1 % — SIGNIFICANT CHANGE UP (ref 10.3–14.5)
RH IG SCN BLD-IMP: POSITIVE — SIGNIFICANT CHANGE UP
SODIUM SERPL-SCNC: 135 MMOL/L — SIGNIFICANT CHANGE UP (ref 135–145)
WBC # BLD: 6.06 K/UL — SIGNIFICANT CHANGE UP (ref 3.8–10.5)
WBC # FLD AUTO: 6.06 K/UL — SIGNIFICANT CHANGE UP (ref 3.8–10.5)

## 2021-04-20 PROCEDURE — 80048 BASIC METABOLIC PNL TOTAL CA: CPT

## 2021-04-20 PROCEDURE — 85027 COMPLETE CBC AUTOMATED: CPT

## 2021-04-20 PROCEDURE — 86901 BLOOD TYPING SEROLOGIC RH(D): CPT

## 2021-04-20 PROCEDURE — 86900 BLOOD TYPING SEROLOGIC ABO: CPT

## 2021-04-20 PROCEDURE — 86850 RBC ANTIBODY SCREEN: CPT

## 2021-04-20 PROCEDURE — G0463: CPT

## 2021-04-20 RX ORDER — LIDOCAINE HCL 20 MG/ML
0.2 VIAL (ML) INJECTION ONCE
Refills: 0 | Status: DISCONTINUED | OUTPATIENT
Start: 2021-05-04 | End: 2021-05-18

## 2021-04-20 RX ORDER — CHOLECALCIFEROL (VITAMIN D3) 125 MCG
1 CAPSULE ORAL
Qty: 0 | Refills: 0 | DISCHARGE

## 2021-04-20 RX ORDER — SODIUM CHLORIDE 9 MG/ML
3 INJECTION INTRAMUSCULAR; INTRAVENOUS; SUBCUTANEOUS EVERY 8 HOURS
Refills: 0 | Status: DISCONTINUED | OUTPATIENT
Start: 2021-05-04 | End: 2021-05-18

## 2021-04-20 NOTE — H&P PST ADULT - NSICDXPASTMEDICALHX_GEN_ALL_CORE_FT
PAST MEDICAL HISTORY:  Arthritis     Cancer of breast right 2003, treated with RT and tamoxifen. No Ivs, BPs or blood draws on that side    Menorrhagia 2010 while tamoxifen, required transfusion    Obesity, Class I, BMI 30-34.9     Osteopenia     Renal calculi passed on own, 2014     PAST MEDICAL HISTORY:  Arthritis pia knees, s/p replacement    Cancer of breast right 2003, treated with RT and tamoxifen. No Ivs, BPs or blood draws on that side    Menorrhagia 2010 while tamoxifen, required transfusion    Obesity, Class I, BMI 30-34.9     Osteopenia     Renal calculi passed on own, 2014

## 2021-04-20 NOTE — H&P PST ADULT - NSICDXPASTSURGICALHX_GEN_ALL_CORE_FT
PAST SURGICAL HISTORY:  C Section x3 (76, 78, 88)    H/O colonoscopy ~3 years ago    H/O total knee replacement Right (5/2016) and Left (10/2015)    S/P bilateral mastectomy 12/3/2020    S/P Breast Lumpectomy Right w/ axillary node biopsy 2003    S/P D&C (status post dilation and curettage) 1986     PAST SURGICAL HISTORY:  C Section x3 (76, 78, 88)    H/O colonoscopy ~3 years ago    H/O total knee replacement Right (5/2016) and Left (10/2015)    S/P bilateral mastectomy 12/10/2020    S/P Breast Lumpectomy Right w/ axillary node biopsy 2003    S/P D&C (status post dilation and curettage) 1986

## 2021-04-20 NOTE — H&P PST ADULT - NEGATIVE GENERAL SYMPTOMS
no fever/no chills/no sweating/no anorexia/no weight loss/no weight gain/no fatigue no fever/no chills

## 2021-04-20 NOTE — H&P PST ADULT - NEGATIVE OPHTHALMOLOGIC SYMPTOMS
no diplopia/no photophobia/no blurred vision L/no pain L/no pain R/no loss of vision L/no loss of vision R

## 2021-04-20 NOTE — H&P PST ADULT - HISTORY OF PRESENT ILLNESS
66 y/o female, with h/o right breast cancer , S/P bilateral mastectomy, DAGO presents to presurgical testing with diagnosis of malignant neoplasm of upper-outer quadrant of right female breast scheduled for bilateral mastectomy bilateral sentinel lymph node biopsy and bilateral deep inferior epigastric  flap and bilateral nipple areola reconstruction. Pt with right breast cancer 17 years ago s/p lumpectomy and radiation with abnormal surveillance mammogram and biopsy.  66 y/o Farsi speaking female, understands/speaks some English, accompanied by her daughter to help translate, with h/o right breast cancer (2003) S/P right breast lumpectomy & RT, with recurring right breast cancer diagnosed 10/2020, S/P bilateral mastectomy, DAGO flap, bilateral sentinel lymph node biopsy 12/10/20, presents to presurgical testing today, scheduled for bilateral revision reconstruction nipple areolar, abdominal scar revision, fat graft to abdomen scar 5/4/2021 with Dr. Blanchard. Patient denies recent travel in the past 30 days. No fever, SOB, cough, flu like symptoms or Covid-related symptoms.    ***Covid PCR scheduled 5/1/21 at Replaced by [facility]

## 2021-04-20 NOTE — H&P PST ADULT - HEALTH CARE MAINTENANCE
Addended by: Alicja Hensley on: 5/5/2020 10:28 PM     Modules accepted: Level of Service Sees PCP for routine medical management   colonoscopy - three years ago  influenza vaccination - denies  Last PAP Smear 2020  Last Mammo 10/2020

## 2021-04-20 NOTE — H&P PST ADULT - NS PRO LAST MENSTRUAL DATE
Outpatient Physical Therapy Ortho Re-Evaluation  [facility]     Patient Name: Nereida CUMMINGS  : 1951  MRN: [id number]  Today's Date: 2019      Visit Date: 2019    There is no problem list on file for this patient.       No past medical history on file.     No past surgical history on file.    Visit Dx:     ICD-10-CM ICD-9-CM   1. Low back pain, unspecified back pain laterality, unspecified chronicity, with sciatica presence unspecified M54.5 724.2             PT Ortho     Row Name 19 1230       Subjective Comments    Subjective Comments  Patient reports that she has been traveling out of town to [place] which involved significant amount of walking.  She reports that she did have some increased in low back symptoms with the increased walking.  She also had increased pain with riding in car.  Following prolonged sit in car, she had difficulty with initial stand.  Patient states that prior to this trip, she was having less pain with transfers out of bed.  She is having more pain with this transfer OOB.  Patient states that when she is consistent with exercises and stretches prior to getting out of bed, she has less pain. Patient reports that while traveling her pain level increased to 7/10  -SP       Posture/Observations    Forward Head  Mild  -SP    Cervical Lordosis  Decreased  -SP    Thoracic Kyphosis  -- mild CT junction kyphosis; flattened thoracic  -SP    Lumbar lordosis  Decreased  -SP    Iliac crests  Bilateral:;Normal  -SP    Posture/Observations Comments  Hyper mobile at L1-L2 level.  Decreased mobility L2-L5  -SP       Neural Tension Signs- Lower Quarter Clearing    SLR  Bilateral:;Negative  -SP       Sensory Screen for Light Touch- Lower Quarter Clearing    L1 (inguinal area)  Bilateral:;Intact  -SP    L2 (anterior mid thigh)  Bilateral:;Intact  -SP    L3 (distal anterior thigh)  Bilateral:;Intact  -SP    L4 (medial lower leg/foot)  Bilateral:;Intact  -SP    L5 (lateral  lower leg/great toe)  Right:;Diminished  -SP    S1 (bottom of foot)  Right:;Diminished  -SP       Myotomal Screen- Lower Quarter Clearing    Hip flexion (L2)  Bilateral:;4+ (Good +)  -SP    Knee extension (L3)  Bilateral:;4+ (Good +)  -SP    Ankle DF (L4)  Bilateral:;4+ (Good +)  -SP    Great toe extension (L5)  Bilateral:;4+ (Good +)  -SP    Ankle PF (S1)  Bilateral:;4+ (Good +)  -SP    Knee flexion (S2)  Bilateral:;4+ (Good +)  -SP       SI/Hip Screen- Lower Quarter Clearing    Rhonda's/Adam's test  Right:;Positive  -SP       Lumbar/SI Special Tests    SLR (Neural Tension)  Bilateral:;Negative  -SP       Lumbosacral Palpation    Lumbosacral Segment  Bilateral:;Tender;Guarded/taut  -SP    Erector Spinae (Paraspinals)  Bilateral:;Guarded/taut;Tender  -SP       Head/Neck/Trunk    Trunk Extension AROM  decreased by 50% from full range  -SP    Trunk Flexion AROM  decreased by 25% from full range with complaints of tightness and pain in lumbar paraspinal area  -SP    Trunk Lt Lateral Flexion AROM  decreased by 50% from full range  -SP    Trunk Rt Lateral Flexion AROM  decreased by 50% from full range with right L/S area pain at end range  -SP    Trunk Lt Rotation AROM  decreased by 50% from full range with reports of tightness at end range  -SP    Trunk Rt Rotation AROM  decreased by 50% from full range with reports of tightness at end range  -SP       MMT Neck/Trunk    Trunk Flexion MMT, Gross Movement  (3+/5) fair plus  -SP    Left Pelvic Elevation MMT, Gross Movement  (2+/5) poor plus  -SP    Right Pelvic Elevation MMT, Gross Movement:  (2+/5) poor plus  -SP    Neck/Trunk Comments   improved pelvic but unable to maintain with hip flexion  -SP       Sensation    Sensation WNL?  WFL  -SP       Lower Extremity Flexibility    Hamstrings  Bilateral:;Mildly limited  -SP    ITB  Right:;Mildly limited  -SP    Hip External Rotators  Bilateral:;Mildly limited  -SP    Hip Internal Rotators  Bilateral:;Mildly limited  -SP        Transfers    Sit-Stand [place] (Transfers)  independent  -SP    Stand-Sit [place] (Transfers)  independent  -SP       Gait/Stairs Assessment/Training    [place] Level (Gait)  independent  -SP    Pattern (Gait)  swing-through  -SP    Bilateral Gait Deviations  forward flexed posture  -SP      User Key  (r) = Recorded By, (t) = Taken By, (c) = Cosigned By    Initials Name Provider Type    Taryn Osman, PT Physical Therapist                      Therapy Education  Given: HEP  Program: Reinforced  How Provided: Verbal  Provided to: Patient  Level of Understanding: Verbalized, Demonstrated     PT OP Goals     Row Name 05/28/19 1230          PT Short Term Goals    STG Date to Achieve  06/12/19  -SP     STG 1  Patient instructed in and demonstrates appropriate technique with lift and carry activity  -SP     STG 1 Progress  Partially Met  -SP     STG 2  Patient demonstrates trunk AROM to wfl without complaints of pain at end range  -SP     STG 2 Progress  Progressing;Ongoing  -SP     STG 3  Patient reports improved ability to sleep through night without waking due to lumbar area pain  -SP     STG 3 Progress  Ongoing;Progressing  -SP        Long Term Goals    LTG Date to Achieve  05/10/19  -SP     LTG 1  Patient demonstrates appropriate technique with lift floor to waist  5lbs  x 3 reps without increased pain or compensation  -SP     LTG 1 Progress  Ongoing  -SP     LTG 2  Patient reports she is able to get out of bed in am with pain <2/10 and ambulate without difficulty  -SP     LTG 2 Progress  Ongoing  -SP     LTG 3  Patient independent with HEP  -SP     LTG 3 Progress  Partially Met  -SP        Time Calculation    PT Goal Re-Cert Due Date  06/27/19  -SP       User Key  (r) = Recorded By, (t) = Taken By, (c) = Cosigned By    Initials Name Provider Type    Taryn Osman, PT Physical Therapist          PT Assessment/Plan     Row Name 05/28/19 2934          PT Assessment     Assessment Comments  Patient has made progress toward goals.  However, due to recent travel out of town, she has had an increase in her back symptoms with pain elevating to 7/10.  She presents with pain, decreased trunk AROM, difficulty with transfers and functional mobility.    -SP        PT Plan    PT Frequency  1x/week;2x/week  -SP     Predicted Duration of Therapy Intervention (Therapy Eval)  4 weeks  -SP     PT Plan Comments  Continue to progress mobility and trunk strengthing exercise  -SP       User Key  (r) = Recorded By, (t) = Taken By, (c) = Cosigned By    Initials Name Provider Type    SP Taryn Julian, PT Physical Therapist          Modalities     Row Name 05/28/19 1230             Moist Heat    MH Applied  Yes  -SP      Location  (B) L/S area  -SP      Rx Minutes  12 mins  -SP      MH Prior to Rx  Yes  -SP         Ultrasound 40135    Location  (B) L/S area patient in sidelying  -SP      Duty Cycle  100  -SP      Frequency  1.0 MHz  -SP      Intensity - Wts/cm  1.5  -SP        User Key  (r) = Recorded By, (t) = Taken By, (c) = Cosigned By    Initials Name Provider Type    Taryn Osman, PT Physical Therapist        Exercises     Row Name 05/28/19 1230             Subjective Comments    Subjective Comments  Patient reports that she has been traveling out of town to [facility] which involved significant amount of walking.  She reports that she did have some increased in low back symptoms with the increased walking.  She also had increased pain with riding in car.  Following prolonged sit in car, she had difficulty with initial stand.  Patient states that prior to this trip, she was having less pain with transfers out of bed.  She is having more pain with this transfer OOB.  Patient states that when she is consistent with exercises and stretches prior to getting out of bed, she has less pain. Patient reports that while traveling her pain level increased to 7/10  -SP          Exercise 1    Exercise Name 1  SKTC  -SP      Reps 1  3   -SP      Time 1  20 sec   -SP         Exercise 2    Exercise Name 2  Piriformis stretch  -SP      Reps 2  3  -SP      Time 2  20 sec  -SP         Exercise 3    Exercise Name 3  Hamstring stretch  -SP      Reps 3  3  -SP      Time 3  20 sec  -SP         Exercise 4    Exercise Name 4  DKTC  -SP      Reps 4  3  -SP      Time 4  20 sec  -SP         Exercise 5    Exercise Name 5  PPT   -SP      Reps 5  15  -SP      Time 5  5 sec  -SP         Exercise 6    Exercise Name 6  PPT with ball squeeze  -SP      Reps 6  20  -SP      Time 6  5 sec  -SP         Exercise 7    Exercise Name 7  PPT with BKFO  -SP      Sets 7  2  -SP      Reps 7  15  -SP         Exercise 8    Exercise Name 8  seated on je disc: marches  -SP      Reps 8  25  -SP         Exercise 9    Exercise Name 9  PPT vs wall  -SP      Reps 9  25  -SP      Time 9  5 sec  -SP        User Key  (r) = Recorded By, (t) = Taken By, (c) = Cosigned By    Initials Name Provider Type    Taryn Osman, LYLY Physical Therapist           Manual Rx (last 36 hours)      Manual Treatments     Row Name 05/28/19 1230             Manual Rx 1    Manual Rx 1 Location  right innominate  -SP      Manual Rx 1 Type  MET: pelvic clearing and right innominate correct  -SP        User Key  (r) = Recorded By, (t) = Taken By, (c) = Cosigned By    Initials Name Provider Type    Taryn Osman, LYLY Physical Therapist                                Time Calculation:     Start Time: 1230  Stop Time: 1341  Time Calculation (min): 71 min     Therapy Charges for Today     Code Description Service Date Service Provider Modifiers Qty    [id number]  PT THER PROC EA 15 MIN 5/28/2019 Taryn Julian, PT GP 1                    Taryn Julian PT  5/28/2019       post-menopause

## 2021-04-20 NOTE — H&P PST ADULT - RS GEN PE MLT RESP DETAILS PC
airway patent/breath sounds equal/good air movement/respirations non-labored/clear to auscultation bilaterally good air movement/respirations non-labored/clear to auscultation bilaterally/no rales/no rhonchi/no wheezes

## 2021-04-20 NOTE — H&P PST ADULT - LYMPHATICS COMMENTS
1. Stay on fluoxetine  2. Taper off wellbutrin:  1/2 tablet daily for a week, then 1/2 tablet every other day for a week, then stop.    3. Take 40 mg of lasix once per day.  If systolic blood pressure is less than 80, hold lasix.   No anterior cervical lymphadenopathy

## 2021-04-20 NOTE — H&P PST ADULT - NSICDXPROBLEM_GEN_ALL_CORE_FT
PROBLEM DIAGNOSES  Problem: H/O bilateral mastectomy  Assessment and Plan: Scheduled bilateral revision reconstruction nipple areolar, abdominal scar revision, fat graft to abdomen scar 5/4/2021  Pre-op instructions given to pt & daughter, chlorhexidine provided  Lab work sent at PST, pending results  Covid PCR scheduled 5/1 at [facility]

## 2021-04-20 NOTE — H&P PST ADULT - ASSESSMENT
Problem: malignant neoplasm of upper-outer quadrant of right female breast   Assessment and Plan: Pt is tentatively scheduled for bilateral mastectomy bilateral sentinel lymph node biopsy and bilateral deep inferior epigastric  flap and bilateral nipple areola reconstruction for 12/10/20. Pre-op instructions provided. Pt given verbal and written instructions with teach back on chlorhexidine shampoo and pepcid. Pt has a scheduled preop COVID test. Pt verbalized understanding with return demonstration.     Pending medical evaluation c/o blurry vision right eye, had ophto eval, did not find opthalmologic etiology, was told symptom may be related to possible TIA?. Pt was advised to f/u with PCP for evaluation.

## 2021-04-20 NOTE — H&P PST ADULT - BREASTS COMMENTS
preop dx right breast cancer preop dx right breast cancer, s/p bilateral mastectomy, desires nipple reconstruction

## 2021-04-20 NOTE — H&P PST ADULT - PSYCHIATRIC
detailed exam details… Affect and characteristics of appearance, verbalizations, behaviors are appropriate

## 2021-04-20 NOTE — H&P PST ADULT - VISION (WITH CORRECTIVE LENSES IF THE PATIENT USUALLY WEARS THEM):
wears contact lenses/Normal vision: sees adequately in most situations; can see medication labels, newsprint

## 2021-04-20 NOTE — H&P PST ADULT - MUSCULOSKELETAL
ROM intact/no joint swelling/no joint erythema/no joint warmth/no calf tenderness/normal strength negative detailed exam ROM intact/no joint swelling/no calf tenderness/normal strength

## 2021-04-26 PROBLEM — M19.90 UNSPECIFIED OSTEOARTHRITIS, UNSPECIFIED SITE: Chronic | Status: ACTIVE | Noted: 2020-12-03

## 2021-05-01 ENCOUNTER — OUTPATIENT (OUTPATIENT)
Dept: OUTPATIENT SERVICES | Facility: HOSPITAL | Age: 66
LOS: 1 days | End: 2021-05-01
Payer: MEDICARE

## 2021-05-01 DIAGNOSIS — Z98.890 OTHER SPECIFIED POSTPROCEDURAL STATES: Chronic | ICD-10-CM

## 2021-05-01 DIAGNOSIS — Z96.659 PRESENCE OF UNSPECIFIED ARTIFICIAL KNEE JOINT: Chronic | ICD-10-CM

## 2021-05-01 DIAGNOSIS — Z98.89 OTHER SPECIFIED POSTPROCEDURAL STATES: Chronic | ICD-10-CM

## 2021-05-01 DIAGNOSIS — Z11.52 ENCOUNTER FOR SCREENING FOR COVID-19: ICD-10-CM

## 2021-05-01 DIAGNOSIS — Z90.13 ACQUIRED ABSENCE OF BILATERAL BREASTS AND NIPPLES: Chronic | ICD-10-CM

## 2021-05-01 LAB — SARS-COV-2 RNA SPEC QL NAA+PROBE: SIGNIFICANT CHANGE UP

## 2021-05-01 PROCEDURE — C9803: CPT

## 2021-05-01 PROCEDURE — U0003: CPT

## 2021-05-01 PROCEDURE — U0005: CPT

## 2021-05-03 ENCOUNTER — TRANSCRIPTION ENCOUNTER (OUTPATIENT)
Age: 66
End: 2021-05-03

## 2021-05-03 RX ORDER — ONDANSETRON 8 MG/1
4 TABLET, FILM COATED ORAL ONCE
Refills: 0 | Status: DISCONTINUED | OUTPATIENT
Start: 2021-05-04 | End: 2021-05-18

## 2021-05-03 RX ORDER — SODIUM CHLORIDE 9 MG/ML
1000 INJECTION, SOLUTION INTRAVENOUS
Refills: 0 | Status: DISCONTINUED | OUTPATIENT
Start: 2021-05-04 | End: 2021-05-18

## 2021-05-04 ENCOUNTER — RESULT REVIEW (OUTPATIENT)
Age: 66
End: 2021-05-04

## 2021-05-04 ENCOUNTER — OUTPATIENT (OUTPATIENT)
Dept: OUTPATIENT SERVICES | Facility: HOSPITAL | Age: 66
LOS: 1 days | End: 2021-05-04
Payer: MEDICARE

## 2021-05-04 VITALS
SYSTOLIC BLOOD PRESSURE: 128 MMHG | DIASTOLIC BLOOD PRESSURE: 69 MMHG | RESPIRATION RATE: 16 BRPM | OXYGEN SATURATION: 97 % | HEART RATE: 86 BPM

## 2021-05-04 VITALS
OXYGEN SATURATION: 97 % | TEMPERATURE: 98 F | SYSTOLIC BLOOD PRESSURE: 117 MMHG | HEIGHT: 61 IN | HEART RATE: 73 BPM | RESPIRATION RATE: 16 BRPM | WEIGHT: 143.96 LBS | DIASTOLIC BLOOD PRESSURE: 80 MMHG

## 2021-05-04 DIAGNOSIS — Z90.13 ACQUIRED ABSENCE OF BILATERAL BREASTS AND NIPPLES: Chronic | ICD-10-CM

## 2021-05-04 DIAGNOSIS — Z85.3 PERSONAL HISTORY OF MALIGNANT NEOPLASM OF BREAST: ICD-10-CM

## 2021-05-04 DIAGNOSIS — S31.109S UNSPECIFIED OPEN WOUND OF ABDOMINAL WALL, UNSPECIFIED QUADRANT WITHOUT PENETRATION INTO PERITONEAL CAVITY, SEQUELA: ICD-10-CM

## 2021-05-04 DIAGNOSIS — Z90.13 ACQUIRED ABSENCE OF BILATERAL BREASTS AND NIPPLES: ICD-10-CM

## 2021-05-04 DIAGNOSIS — Z98.89 OTHER SPECIFIED POSTPROCEDURAL STATES: Chronic | ICD-10-CM

## 2021-05-04 DIAGNOSIS — N65.0 DEFORMITY OF RECONSTRUCTED BREAST: ICD-10-CM

## 2021-05-04 DIAGNOSIS — Z96.659 PRESENCE OF UNSPECIFIED ARTIFICIAL KNEE JOINT: Chronic | ICD-10-CM

## 2021-05-04 DIAGNOSIS — Z98.890 OTHER SPECIFIED POSTPROCEDURAL STATES: Chronic | ICD-10-CM

## 2021-05-04 PROCEDURE — 88305 TISSUE EXAM BY PATHOLOGIST: CPT

## 2021-05-04 PROCEDURE — 86850 RBC ANTIBODY SCREEN: CPT

## 2021-05-04 PROCEDURE — 14001 TIS TRNFR TRUNK 10.1-30SQCM: CPT | Mod: XS

## 2021-05-04 PROCEDURE — 88304 TISSUE EXAM BY PATHOLOGIST: CPT | Mod: 26

## 2021-05-04 PROCEDURE — 86900 BLOOD TYPING SEROLOGIC ABO: CPT

## 2021-05-04 PROCEDURE — 19380 REVJ RECONSTRUCTED BREAST: CPT | Mod: 50

## 2021-05-04 PROCEDURE — C1889: CPT

## 2021-05-04 PROCEDURE — 88305 TISSUE EXAM BY PATHOLOGIST: CPT | Mod: 26

## 2021-05-04 PROCEDURE — 15877 SUCTION LIPECTOMY TRUNK: CPT | Mod: XS

## 2021-05-04 PROCEDURE — 88304 TISSUE EXAM BY PATHOLOGIST: CPT

## 2021-05-04 PROCEDURE — 86901 BLOOD TYPING SEROLOGIC RH(D): CPT

## 2021-05-04 PROCEDURE — 19350 NIPPLE/AREOLA RECONSTRUCTION: CPT | Mod: 50

## 2021-05-04 RX ORDER — CHLORHEXIDINE GLUCONATE 213 G/1000ML
1 SOLUTION TOPICAL ONCE
Refills: 0 | Status: COMPLETED | OUTPATIENT
Start: 2021-05-04 | End: 2021-05-04

## 2021-05-04 RX ORDER — IBUPROFEN 200 MG
400 TABLET ORAL ONCE
Refills: 0 | Status: DISCONTINUED | OUTPATIENT
Start: 2021-05-04 | End: 2021-05-18

## 2021-05-04 RX ORDER — PREGABALIN 225 MG/1
0 CAPSULE ORAL
Qty: 0 | Refills: 0 | DISCHARGE

## 2021-05-04 RX ORDER — APREPITANT 80 MG/1
40 CAPSULE ORAL ONCE
Refills: 0 | Status: COMPLETED | OUTPATIENT
Start: 2021-05-04 | End: 2021-05-04

## 2021-05-04 RX ORDER — CEFAZOLIN SODIUM 1 G
2000 VIAL (EA) INJECTION ONCE
Refills: 0 | Status: COMPLETED | OUTPATIENT
Start: 2021-05-04 | End: 2021-05-04

## 2021-05-04 RX ORDER — ERGOCALCIFEROL 1.25 MG/1
0 CAPSULE ORAL
Qty: 0 | Refills: 0 | DISCHARGE

## 2021-05-04 RX ADMIN — CHLORHEXIDINE GLUCONATE 1 APPLICATION(S): 213 SOLUTION TOPICAL at 07:14

## 2021-05-04 RX ADMIN — APREPITANT 40 MILLIGRAM(S): 80 CAPSULE ORAL at 07:14

## 2021-05-04 NOTE — BRIEF OPERATIVE NOTE - OPERATION/FINDINGS
Bilateral revision of previous breast reconstruction with bilateral nipple-areolar complex reconstruction. Abdominal scar revision with liposuction of mons.

## 2021-05-04 NOTE — ASU PATIENT PROFILE, ADULT - PMH
Arthritis  pia knees, s/p replacement  Cancer of breast  right 2003, treated with RT and tamoxifen. No Ivs, BPs or blood draws on that side  Menorrhagia  2010 while tamoxifen, required transfusion  Obesity, Class I, BMI 30-34.9    Osteopenia    Renal calculi  passed on own, 2014

## 2021-05-04 NOTE — ASU DISCHARGE PLAN (ADULT/PEDIATRIC) - ASU DC SPECIAL INSTRUCTIONSFT
Please follow up with Dr. Blanchard within x1 week after discharge from the hospital. You may call [phone] to schedule an appointment.       Please keep all the dressings in place, clean and dry. These will be removed at your follow up appointment. You may sponge bathe, do not submerge in water.     Please take the prescriptions as directed that you received from Dr. Blanchard's office.

## 2021-05-04 NOTE — PRE-ANESTHESIA EVALUATION ADULT - NSPROPOSEDPROCEDFT_GEN_ALL_CORE
Bilateral revision reconstruction; nipple areolar abdominal scar revision; revision fat graft to abdominal scar

## 2021-05-04 NOTE — BRIEF OPERATIVE NOTE - NSICDXBRIEFPROCEDURE_GEN_ALL_CORE_FT
PROCEDURES:  Revision, flap, breast 04-May-2021 10:39:39  Junie Barfield  Reconstruction, nipple 04-May-2021 10:39:58  Junie Barfield

## 2021-05-04 NOTE — ASU DISCHARGE PLAN (ADULT/PEDIATRIC) - NURSING INSTRUCTIONS
******************************************************************************************  Next dose of TYLENOL may be taken at or after _______2:45______ PM if needed. DO NOT take any additional products containing TYLENOL or ACETAMINOPHEN, such as VICODIN, PERCOCET, NORCO, EXCEDRIN, and any over-the-counter cold medications until this time. DO NOT CONSUME MORE THAN 2118-9052 MG of TYLENOL (acetaminophen) in a 24-hour period.   ******************************************************************************************

## 2021-05-04 NOTE — ASU PATIENT PROFILE, ADULT - VISION (WITH CORRECTIVE LENSES IF THE PATIENT USUALLY WEARS THEM):
wears contact lenses/Normal vision: sees adequately in most situations; can see medication labels, newsprint wears contact lenses/Partially impaired: cannot see medication labels or newsprint, but can see obstacles in path, and the surrounding layout; can count fingers at arm's length

## 2021-05-04 NOTE — ASU DISCHARGE PLAN (ADULT/PEDIATRIC) - CARE PROVIDER_API CALL
Js Blanchard  PLASTIC SURGERY  [address], [address]  [place] [address]  Phone: [phone]  Fax: [phone]  Follow Up Time:

## 2021-05-04 NOTE — ASU PATIENT PROFILE, ADULT - PSH
C Section  x3 (76, 78, 88)  H/O colonoscopy  ~3 years ago  H/O total knee replacement  Right (5/2016) and Left (10/2015)  S/P bilateral mastectomy  12/10/2020  S/P Breast Lumpectomy  Right w/ axillary node biopsy 2003  S/P D&C (status post dilation and curettage)  1986

## 2021-05-04 NOTE — PRE-ANESTHESIA EVALUATION ADULT - MALLAMPATI CLASS
Veneers on upper central incisors/Class II - visualization of the soft palate, fauces, and uvula Veneers on upper central incisors; small chip in left upper incisor (next to canine)/Class II - visualization of the soft palate, fauces, and uvula

## 2021-05-07 LAB — SURGICAL PATHOLOGY STUDY: SIGNIFICANT CHANGE UP

## 2021-09-03 ENCOUNTER — APPOINTMENT (OUTPATIENT)
Dept: ORTHOPEDIC SURGERY | Facility: CLINIC | Age: 66
End: 2021-09-03
Payer: MEDICARE

## 2021-09-03 DIAGNOSIS — S62.397A OTHER FRACTURE OF FIFTH METACARPAL BONE, LEFT HAND, INITIAL ENCOUNTER FOR CLOSED FRACTURE: ICD-10-CM

## 2021-09-03 PROCEDURE — 73130 X-RAY EXAM OF HAND: CPT | Mod: LT

## 2021-09-03 PROCEDURE — 29130 APPL FINGER SPLINT STATIC: CPT | Mod: F4

## 2021-09-03 PROCEDURE — 99203 OFFICE O/P NEW LOW 30 MIN: CPT | Mod: 25

## 2021-09-06 NOTE — HISTORY OF PRESENT ILLNESS
[de-identified] : Ms. INDY SALCEDO  is a 66 year  presenting to the office complaining of left hand pain. . Patient reports pain began today, 09/03/2021 after tripping and falling, bracing her fall with her left hand. she notes she hit her face at the time. denies headache, LOC, dizziness, N/V. Patient denies injury or trauma to the area. The patient describes the pain as a dull aching, and occasionally sharp pain localized to the dorsal aspect of her  left hand that is intermittent in nature.  Her symptoms are exacerbated with any movement of the wrist, hand, and gripping of the hand.  Patient reports associated weakness. Denies numbness and tingling in the upper extremity. \par Patient is taking NSAIDs for pain relief with mild to moderate  relief in symptoms. Patient denies any other complaints at this time.

## 2021-09-06 NOTE — PHYSICAL EXAM
[de-identified] : Left Upper Extremity\par o Elbow :\par ¦ Inspection/Palpation : no tenderness, no swelling, no deformities\par ¦ Range of Motion : full and painless in all planes, no crepitus\par ¦ Strength : flexion and extension 5/5\par ¦ Stability : no joint instability on provocative testing\par o Muscle Bulk : no atrophy\par o Sensation : sensation intact to light touch\par o Skin : no skin lesions, no discoloration\par o Vascular Exam : no edema, no cyanosis, radial and ulnar pulses normal \par \par o Wrist:\par ¦ Inspection/Palpation : no tenderness, swelling or deformities\par ¦ Range of Motion : full and painless in all planes, no crepitus\par ¦ Strength : extension, flexion, ulnar deviation and radial deviation 5/5\par ¦ Stability : no joint instability on provocative testing\par ¦ Tests/Signs : Tinel's sign negative over carpal tunnel\par \par o Hand :\par ¦ Inspection/Palpation : diffuse 5th metacarpal tenderness to palpation with localized swelling, no pain with axial compression\par ¦ Range of Motion : not assessed today due to fracture \par ¦ Strength : all intrinsic and extrinsic hand muscles: not assessed today due to fracture \par ¦ Stability : no joint instability on provocative testing\par o Muscle Bulk : no atrophy\par o Sensation : sensation intact to light touch\par o Skin : no skin lesions or discoloration\par o Vascular Exam : no edema or cyanosis, radial and ulnar pulses normal\par  [de-identified] : o Left Hand and forearm: AP, lateral and oblique views of the hand were obtained, there are no soft tissue abnormalities, alignment is normal, normal appearing joint spaces, normal bone density, no bony lesions, questionable lucency distal 5th metacarpal, \par \par \par \par  \par

## 2021-09-06 NOTE — PROCEDURE
[de-identified] : A finger splint was applied in clinic today. Neurovascular status was assessed pre and post placement. Patient tolerated the splint placement well, with no complaints.

## 2021-09-06 NOTE — DISCUSSION/SUMMARY
[de-identified] : The underlying pathophysiology was reviewed in great detail with the patient as well as the various treatment options, including ice, analgesics, NSAIDs, Physical therapy, steroid injections, immobilization.\par \par A finger splint was applied in clinic today. \par \par Activity modifications and restrictions were discussed.\par \par FU 1 week for repeat xrays \par \par All questions were answered, all alternatives discussed and the patient is in complete agreement with that plan. Follow-up appointment as instructed. Any issues and the patient will call or come in sooner.\par
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
## 2021-12-31 NOTE — H&P PST ADULT - GENERAL COMMENTS
Denies recent travel or hx of COVID-19 infection
ROS as per HPI, rest 10pt ROS reviewed and negative

## 2022-12-20 NOTE — ASU PATIENT PROFILE, ADULT - NS PRO LACT YNNA
no " I was told by my ortho doctor to come to ED @ 5 am for covid testing and other tests in preparation for my surgery of my ankle today."

## 2024-02-27 ENCOUNTER — APPOINTMENT (OUTPATIENT)
Dept: OBGYN | Facility: CLINIC | Age: 69
End: 2024-02-27
Payer: MEDICARE

## 2024-02-27 PROCEDURE — 76830 TRANSVAGINAL US NON-OB: CPT

## 2024-02-27 PROCEDURE — G0101: CPT | Mod: GA

## 2024-08-10 ENCOUNTER — APPOINTMENT (OUTPATIENT)
Dept: ORTHOPEDIC SURGERY | Facility: CLINIC | Age: 69
End: 2024-08-10

## 2024-08-10 PROCEDURE — 99213 OFFICE O/P EST LOW 20 MIN: CPT

## 2024-08-10 NOTE — HISTORY OF PRESENT ILLNESS
[de-identified] : 69-year-old female presents complaining of left knee pain.  She has a history of left total knee replacement several years ago by Dr. Mancilla.  She was doing well with her knee replacement when yesterday the oven door slipped and fell and hit her knee she had immediate pain and swelling but today her symptoms have significantly improved she is ambulating without assistive devices she does note some bruising to the area otherwise has no other complaints.

## 2024-08-10 NOTE — PHYSICAL EXAM
[Antalgic] : not antalgic [LE] : Sensory: Intact in bilateral lower extremities [DP] : dorsalis pedis 2+ and symmetric bilaterally [PT] : posterior tibial 2+ and symmetric bilaterally [Normal] : no peripheral adenopathy appreciated [de-identified] : : Examination of the left knee: Inspection: Slight soft tissue swelling and ecchymosis noted most notably to the medial aspect of the knee as well as the proximal tibia.  No gross deformity. Palpation: There is tenderness to palpation over the proximal tibia and medial compartment over the area of ecchymosis. Range of motion: Range of motion is 0 to 95 degrees of flexion which patient states is no change in her previous range of motion. Ligamentous exam: Stable with varus and valgus stress and anterior posterior draw Motor and sensory exam are intact  [de-identified] : X-rays of the left knee: Total knee replacement implants are in place there is no signs of osteolysis or loosening no significant signs of poly wear anatomical alignment is well-maintained.

## 2024-08-10 NOTE — DISCUSSION/SUMMARY
[Medication Risks Reviewed] : Medication risks reviewed [de-identified] :  Left total knee replacement Left knee contusion Left knee pain resolving  Plan: Patient is improving recommend an Ace wrap if they do not continue to see improvement over the next 2 weeks that she return to orthopedic for reevaluation.  She should continue with activity modification ice elevation and moist heat as needed.  All the patient's questions were answered.  30 minutes of face-to-face time was used to evaluate the patient discussed the diagnosis treatment options and reviewed diagnostic imaging.

## 2024-08-10 NOTE — REASON FOR VISIT
[Initial Visit] : an initial visit for [Artificial Knee Joint] : an artificial knee joint [Knee Pain] : knee pain [Family Member] : family member